# Patient Record
Sex: MALE | Race: BLACK OR AFRICAN AMERICAN | NOT HISPANIC OR LATINO | Employment: FULL TIME | URBAN - METROPOLITAN AREA
[De-identification: names, ages, dates, MRNs, and addresses within clinical notes are randomized per-mention and may not be internally consistent; named-entity substitution may affect disease eponyms.]

---

## 2023-04-25 LAB
ALANINE AMINOTRANSFERASE (SGPT) (U/L) IN SER/PLAS: 25 U/L (ref 10–52)
ALBUMIN (G/DL) IN SER/PLAS: 4.8 G/DL (ref 3.4–5)
ALKALINE PHOSPHATASE (U/L) IN SER/PLAS: 63 U/L (ref 33–136)
ANION GAP IN SER/PLAS: 13 MMOL/L (ref 10–20)
ASPARTATE AMINOTRANSFERASE (SGOT) (U/L) IN SER/PLAS: 20 U/L (ref 9–39)
BASOPHILS (10*3/UL) IN BLOOD BY AUTOMATED COUNT: 0.03 X10E9/L (ref 0–0.1)
BASOPHILS/100 LEUKOCYTES IN BLOOD BY AUTOMATED COUNT: 0.6 % (ref 0–2)
BILIRUBIN TOTAL (MG/DL) IN SER/PLAS: 0.6 MG/DL (ref 0–1.2)
CALCIUM (MG/DL) IN SER/PLAS: 9.7 MG/DL (ref 8.6–10.6)
CARBON DIOXIDE, TOTAL (MMOL/L) IN SER/PLAS: 31 MMOL/L (ref 21–32)
CD3+CD4+ ABSOLUTE: 0.75 X10E9/L (ref 0.35–2.74)
CD3+CD8+ ABSOLUTE: 0.49 X10E9/L (ref 0.08–1.49)
CD4/CD8 RATIO: 1.55 (ref 1–3.5)
CD45%: 100 %
CHLORIDE (MMOL/L) IN SER/PLAS: 105 MMOL/L (ref 98–107)
CP CD3+CD4+%: 48 % (ref 29–57)
CP CD3+CD8+%: 31 % (ref 7–31)
CREATININE (MG/DL) IN SER/PLAS: 0.89 MG/DL (ref 0.5–1.3)
EOSINOPHILS (10*3/UL) IN BLOOD BY AUTOMATED COUNT: 0.09 X10E9/L (ref 0–0.7)
EOSINOPHILS/100 LEUKOCYTES IN BLOOD BY AUTOMATED COUNT: 1.7 % (ref 0–6)
ERYTHROCYTE DISTRIBUTION WIDTH (RATIO) BY AUTOMATED COUNT: 12.1 % (ref 11.5–14.5)
ERYTHROCYTE MEAN CORPUSCULAR HEMOGLOBIN CONCENTRATION (G/DL) BY AUTOMATED: 32.4 G/DL (ref 32–36)
ERYTHROCYTE MEAN CORPUSCULAR VOLUME (FL) BY AUTOMATED COUNT: 99 FL (ref 80–100)
ERYTHROCYTES (10*6/UL) IN BLOOD BY AUTOMATED COUNT: 4.16 X10E12/L (ref 4.5–5.9)
FMETH: NORMAL
FSIT1: NORMAL
GFR MALE: >90 ML/MIN/1.73M2
GLUCOSE (MG/DL) IN SER/PLAS: 66 MG/DL (ref 74–99)
HEMATOCRIT (%) IN BLOOD BY AUTOMATED COUNT: 41.1 % (ref 41–52)
HEMOGLOBIN (G/DL) IN BLOOD: 13.3 G/DL (ref 13.5–17.5)
IMMATURE GRANULOCYTES/100 LEUKOCYTES IN BLOOD BY AUTOMATED COUNT: 0.2 % (ref 0–0.9)
LEUKOCYTES (10*3/UL) IN BLOOD BY AUTOMATED COUNT: 5.2 X10E9/L (ref 4.4–11.3)
LYMPHOCYTES (10*3/UL) IN BLOOD BY AUTOMATED COUNT: 1.57 X10E9/L (ref 1.2–4.8)
LYMPHOCYTES/100 LEUKOCYTES IN BLOOD BY AUTOMATED COUNT: 30.4 % (ref 13–44)
MONOCYTES (10*3/UL) IN BLOOD BY AUTOMATED COUNT: 0.44 X10E9/L (ref 0.1–1)
MONOCYTES/100 LEUKOCYTES IN BLOOD BY AUTOMATED COUNT: 8.5 % (ref 2–10)
NEUTROPHILS (10*3/UL) IN BLOOD BY AUTOMATED COUNT: 3.02 X10E9/L (ref 1.2–7.7)
NEUTROPHILS/100 LEUKOCYTES IN BLOOD BY AUTOMATED COUNT: 58.6 % (ref 40–80)
NRBC (PER 100 WBCS) BY AUTOMATED COUNT: 0 /100 WBC (ref 0–0)
PLATELETS (10*3/UL) IN BLOOD AUTOMATED COUNT: 212 X10E9/L (ref 150–450)
POTASSIUM (MMOL/L) IN SER/PLAS: 4.6 MMOL/L (ref 3.5–5.3)
PROTEIN TOTAL: 6.9 G/DL (ref 6.4–8.2)
SODIUM (MMOL/L) IN SER/PLAS: 144 MMOL/L (ref 136–145)
UREA NITROGEN (MG/DL) IN SER/PLAS: 14 MG/DL (ref 6–23)

## 2023-04-26 LAB
CHLAMYDIA TRACH., AMPLIFIED: NEGATIVE
HIV-1 RNA PCR VIRAL LOAD LOG: ABNORMAL LOG10 CPY/ML
HIV-1 RNA VIRAL LOAD: ABNORMAL COPIES/ML
N. GONORRHEA, AMPLIFIED: NEGATIVE

## 2023-04-27 LAB
RPR MONITORING: NONREACTIVE
VDRL: NORMAL

## 2023-09-02 PROBLEM — R53.83 FATIGUE: Status: ACTIVE | Noted: 2023-09-02

## 2023-09-02 PROBLEM — R09.81 NASAL CONGESTION: Status: ACTIVE | Noted: 2023-09-02

## 2023-09-02 PROBLEM — K21.9 GASTRIC REFLUX: Status: ACTIVE | Noted: 2023-09-02

## 2023-09-02 PROBLEM — L91.8 SKIN TAG: Status: ACTIVE | Noted: 2023-09-02

## 2023-09-02 PROBLEM — M72.2 PLANTAR FASCIITIS: Status: ACTIVE | Noted: 2023-09-02

## 2023-09-02 PROBLEM — A60.00 GENITAL HERPES SIMPLEX: Status: ACTIVE | Noted: 2023-09-02

## 2023-09-02 PROBLEM — M54.50 LOW BACK PAIN: Status: ACTIVE | Noted: 2023-09-02

## 2023-09-02 PROBLEM — K40.90 INGUINAL HERNIA, LEFT: Status: ACTIVE | Noted: 2023-09-02

## 2023-09-02 PROBLEM — N52.9 MALE ERECTILE DISORDER: Status: ACTIVE | Noted: 2023-09-02

## 2023-09-02 PROBLEM — G47.33 OSA (OBSTRUCTIVE SLEEP APNEA): Status: ACTIVE | Noted: 2023-09-02

## 2023-09-02 PROBLEM — G47.00 INSOMNIA: Status: ACTIVE | Noted: 2023-09-02

## 2023-09-02 PROBLEM — R10.32 ABDOMINAL PAIN, LLQ (LEFT LOWER QUADRANT): Status: ACTIVE | Noted: 2023-09-02

## 2023-09-02 PROBLEM — F41.9 ANXIETY: Status: ACTIVE | Noted: 2023-09-02

## 2023-09-02 PROBLEM — M25.512 LEFT SHOULDER PAIN: Status: ACTIVE | Noted: 2023-09-02

## 2023-09-02 PROBLEM — B20 HIV INFECTION, SYMPTOMATIC (MULTI): Status: ACTIVE | Noted: 2023-09-02

## 2023-09-02 PROBLEM — J34.2 NASAL SEPTAL DEVIATION: Status: ACTIVE | Noted: 2023-09-02

## 2023-09-02 PROBLEM — J34.3 HYPERTROPHY OF INFERIOR NASAL TURBINATE: Status: ACTIVE | Noted: 2023-09-02

## 2023-09-02 PROBLEM — M25.569 KNEE PAIN: Status: ACTIVE | Noted: 2023-09-02

## 2023-09-02 RX ORDER — CYANOCOBALAMIN (VITAMIN B-12) 500 MCG
1 TABLET ORAL DAILY
COMMUNITY
Start: 2014-03-02

## 2023-09-02 RX ORDER — MULTIVITAMIN WITH MINERALS
1 TABLET ORAL DAILY
COMMUNITY

## 2023-09-02 RX ORDER — FLUTICASONE PROPIONATE 50 MCG
2 SPRAY, SUSPENSION (ML) NASAL DAILY
COMMUNITY
Start: 2021-04-07

## 2023-09-02 RX ORDER — VARDENAFIL 11.85 MG/1
1 TABLET ORAL DAILY PRN
COMMUNITY
Start: 2014-06-25

## 2023-09-02 RX ORDER — FAMCICLOVIR 250 MG/1
250 TABLET ORAL 3 TIMES DAILY
COMMUNITY
End: 2023-12-13

## 2023-09-02 RX ORDER — NAPROXEN SODIUM 220 MG
TABLET ORAL DAILY PRN
COMMUNITY
End: 2023-10-05 | Stop reason: ALTCHOICE

## 2023-09-02 RX ORDER — EMTRICITABINE AND TENOFOVIR ALAFENAMIDE 200; 25 MG/1; MG/1
1 TABLET ORAL DAILY
COMMUNITY
End: 2023-11-08

## 2023-09-02 RX ORDER — BICTEGRAVIR SODIUM, EMTRICITABINE, AND TENOFOVIR ALAFENAMIDE FUMARATE 50; 200; 25 MG/1; MG/1; MG/1
1 TABLET ORAL DAILY
COMMUNITY
Start: 2022-08-16 | End: 2023-10-05 | Stop reason: SINTOL

## 2023-10-10 ENCOUNTER — OFFICE VISIT (OUTPATIENT)
Dept: IMMUNOLOGY | Facility: CLINIC | Age: 63
End: 2023-10-10
Payer: COMMERCIAL

## 2023-10-10 VITALS
WEIGHT: 182.8 LBS | OXYGEN SATURATION: 95 % | HEART RATE: 66 BPM | DIASTOLIC BLOOD PRESSURE: 75 MMHG | HEIGHT: 70 IN | TEMPERATURE: 98.1 F | BODY MASS INDEX: 26.17 KG/M2 | RESPIRATION RATE: 16 BRPM | SYSTOLIC BLOOD PRESSURE: 125 MMHG

## 2023-10-10 DIAGNOSIS — Z13.1 DIABETES MELLITUS SCREENING: ICD-10-CM

## 2023-10-10 DIAGNOSIS — B20 HIV INFECTION, SYMPTOMATIC (MULTI): Primary | ICD-10-CM

## 2023-10-10 DIAGNOSIS — Z12.5 PROSTATE CANCER SCREENING: ICD-10-CM

## 2023-10-10 DIAGNOSIS — Z13.220 SCREENING CHOLESTEROL LEVEL: ICD-10-CM

## 2023-10-10 PROBLEM — Z86.19 PERSONAL HISTORY OF OTHER INFECTIOUS AND PARASITIC DISEASES: Status: ACTIVE | Noted: 2023-10-10

## 2023-10-10 LAB
ALBUMIN SERPL BCP-MCNC: 4.7 G/DL (ref 3.4–5)
ALP SERPL-CCNC: 68 U/L (ref 33–136)
ALT SERPL W P-5'-P-CCNC: 29 U/L (ref 10–52)
ANION GAP SERPL CALC-SCNC: 14 MMOL/L (ref 10–20)
AST SERPL W P-5'-P-CCNC: 20 U/L (ref 9–39)
BASOPHILS # BLD AUTO: 0.02 X10*3/UL (ref 0–0.1)
BASOPHILS NFR BLD AUTO: 0.4 %
BILIRUB SERPL-MCNC: 0.6 MG/DL (ref 0–1.2)
BUN SERPL-MCNC: 14 MG/DL (ref 6–23)
CALCIUM SERPL-MCNC: 9.7 MG/DL (ref 8.6–10.6)
CHLORIDE SERPL-SCNC: 101 MMOL/L (ref 98–107)
CHOLEST SERPL-MCNC: 181 MG/DL (ref 0–199)
CHOLESTEROL/HDL RATIO: 3.7
CO2 SERPL-SCNC: 29 MMOL/L (ref 21–32)
CREAT SERPL-MCNC: 1.04 MG/DL (ref 0.5–1.3)
EOSINOPHIL # BLD AUTO: 0.08 X10*3/UL (ref 0–0.7)
EOSINOPHIL NFR BLD AUTO: 1.6 %
ERYTHROCYTE [DISTWIDTH] IN BLOOD BY AUTOMATED COUNT: 12.3 % (ref 11.5–14.5)
EST. AVERAGE GLUCOSE BLD GHB EST-MCNC: 65 MG/DL
GFR SERPL CREATININE-BSD FRML MDRD: 80 ML/MIN/1.73M*2
GLUCOSE SERPL-MCNC: 69 MG/DL (ref 74–99)
HBA1C MFR BLD: 3.9 %
HCT VFR BLD AUTO: 46.2 % (ref 41–52)
HDLC SERPL-MCNC: 49.4 MG/DL
HGB BLD-MCNC: 15 G/DL (ref 13.5–17.5)
IMM GRANULOCYTES # BLD AUTO: 0 X10*3/UL (ref 0–0.7)
IMM GRANULOCYTES NFR BLD AUTO: 0 % (ref 0–0.9)
LDLC SERPL CALC-MCNC: 115 MG/DL (ref 140–190)
LYMPHOCYTES # BLD AUTO: 1.67 X10*3/UL (ref 1.2–4.8)
LYMPHOCYTES NFR BLD AUTO: 33.5 %
MCH RBC QN AUTO: 31.5 PG (ref 26–34)
MCHC RBC AUTO-ENTMCNC: 32.5 G/DL (ref 32–36)
MCV RBC AUTO: 97 FL (ref 80–100)
MONOCYTES # BLD AUTO: 0.43 X10*3/UL (ref 0.1–1)
MONOCYTES NFR BLD AUTO: 8.6 %
NEUTROPHILS # BLD AUTO: 2.78 X10*3/UL (ref 1.2–7.7)
NEUTROPHILS NFR BLD AUTO: 55.9 %
NON HDL CHOLESTEROL: 132 MG/DL (ref 0–149)
NRBC BLD-RTO: 0 /100 WBCS (ref 0–0)
PLATELET # BLD AUTO: 216 X10*3/UL (ref 150–450)
PMV BLD AUTO: 11.4 FL (ref 7.5–11.5)
POTASSIUM SERPL-SCNC: 4.3 MMOL/L (ref 3.5–5.3)
PROT SERPL-MCNC: 7.2 G/DL (ref 6.4–8.2)
PSA SERPL-MCNC: 0.6 NG/ML
RBC # BLD AUTO: 4.76 X10*6/UL (ref 4.5–5.9)
SODIUM SERPL-SCNC: 140 MMOL/L (ref 136–145)
TRIGL SERPL-MCNC: 81 MG/DL (ref 0–149)
VLDL: 16 MG/DL (ref 0–40)
WBC # BLD AUTO: 5 X10*3/UL (ref 4.4–11.3)

## 2023-10-10 PROCEDURE — 85025 COMPLETE CBC W/AUTO DIFF WBC: CPT | Performed by: INTERNAL MEDICINE

## 2023-10-10 PROCEDURE — 87536 HIV-1 QUANT&REVRSE TRNSCRPJ: CPT | Performed by: INTERNAL MEDICINE

## 2023-10-10 PROCEDURE — 1036F TOBACCO NON-USER: CPT | Performed by: INTERNAL MEDICINE

## 2023-10-10 PROCEDURE — 36415 COLL VENOUS BLD VENIPUNCTURE: CPT | Performed by: INTERNAL MEDICINE

## 2023-10-10 PROCEDURE — 83036 HEMOGLOBIN GLYCOSYLATED A1C: CPT | Performed by: INTERNAL MEDICINE

## 2023-10-10 PROCEDURE — 80053 COMPREHEN METABOLIC PANEL: CPT | Performed by: INTERNAL MEDICINE

## 2023-10-10 PROCEDURE — 84153 ASSAY OF PSA TOTAL: CPT | Performed by: INTERNAL MEDICINE

## 2023-10-10 PROCEDURE — 88185 FLOWCYTOMETRY/TC ADD-ON: CPT | Mod: TC | Performed by: INTERNAL MEDICINE

## 2023-10-10 PROCEDURE — 80061 LIPID PANEL: CPT | Performed by: INTERNAL MEDICINE

## 2023-10-10 PROCEDURE — 99214 OFFICE O/P EST MOD 30 MIN: CPT | Performed by: INTERNAL MEDICINE

## 2023-10-10 PROCEDURE — 4274F FLU IMMUNO ADMIND RCVD: CPT | Performed by: INTERNAL MEDICINE

## 2023-10-10 RX ORDER — ATORVASTATIN CALCIUM 20 MG/1
20 TABLET, FILM COATED ORAL DAILY
Qty: 30 TABLET | Refills: 11 | Status: SHIPPED | OUTPATIENT
Start: 2023-10-10 | End: 2024-10-09

## 2023-10-10 ASSESSMENT — ENCOUNTER SYMPTOMS
ABDOMINAL PAIN: 0
RHINORRHEA: 0
COUGH: 0
CHILLS: 0
PALPITATIONS: 0
UNEXPECTED WEIGHT CHANGE: 0
SHORTNESS OF BREATH: 0
CONSTIPATION: 0
LIGHT-HEADEDNESS: 0
FEVER: 0
DIZZINESS: 0
FATIGUE: 0
TROUBLE SWALLOWING: 0
NAUSEA: 0
MYALGIAS: 0
SINUS PAIN: 0
HEMATURIA: 0
SINUS PRESSURE: 0
SORE THROAT: 0
DYSURIA: 0
VOMITING: 0
DIARRHEA: 0

## 2023-10-10 ASSESSMENT — PAIN SCALES - GENERAL: PAINLEVEL: 0-NO PAIN

## 2023-10-10 NOTE — PROGRESS NOTES
HIV Clinic Follow-up Visit:    Román Martinez was last seen in [facility], [place] on 4/2023    Missed antiretroviral doses in last 72 hours? No    Sexually active? yes, Partner/s aware of diagnosis? yes,     Condom use? Never, Partner on PrEP? Yes    Tobacco use: No     SUBJECTIVE: Mr. Martinez is doing well, no concerns or complaints. Has been wearing his CPAP every night and no concerns with it. Is sexually active with 1 partner. Takes Descovy/Tivicay every day, no missed doses in the past month, no problems obtaining meds from the pharmacy, no side effects.   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 Getting lots of accolades. Volunteering with an organization out of [place] so will be able to travel more.   Mood is good. Using techniques he learned during COVID, no longer in therapy.  Discussed REPIREVE trial, wiling to start a statin    Review of Systems  Review of Systems   Constitutional:  Negative for chills, fatigue, fever and unexpected weight change.   HENT:  Negative for congestion, postnasal drip, rhinorrhea, sinus pressure, sinus pain, sore throat and trouble swallowing.    Eyes:  Negative for visual disturbance.   Respiratory:  Negative for cough and shortness of breath.    Cardiovascular:  Negative for chest pain and palpitations.   Gastrointestinal:  Negative for abdominal pain, constipation, diarrhea, nausea and vomiting.   Genitourinary:  Negative for dysuria and hematuria.   Musculoskeletal:  Negative for gait problem and myalgias.   Skin:  Negative for rash.   Neurological:  Negative for dizziness, syncope and light-headedness.       CURRENT MEDICATIONS:    Current Outpatient Medications:     cholecalciferol (Vitamin D-3) 10 MCG (400 UNIT) tablet, Take 1 tablet (10 mcg) by mouth once daily. LIKE DIRECTED, Disp: , Rfl:     dolutegravir (Tivicay) 50 mg tablet, Take 1 tablet (50 mg) by mouth once daily., Disp: , Rfl:     emtricitabine-tenofovir alafen (Descovy) 200-25 mg tablet, Take 1 tablet by mouth  once daily., Disp: , Rfl:     famciclovir (Famvir) 250 mg tablet, Take 1 tablet (250 mg) by mouth 3 times a day., Disp: , Rfl:     fluticasone (Flonase) 50 mcg/actuation nasal spray, Administer 2 sprays into affected nostril(s) once daily., Disp: , Rfl:     multivitamin with minerals (Multiple Vitamin-Minerals) tablet, Take 1 tablet by mouth once daily., Disp: , Rfl:     vardenafiL 10 mg tablet,disintegrating, Take 1 tablet by mouth once daily as needed., Disp: , Rfl:     PHYSICAL EXAMINATION:  Visit Vitals  Smoking Status Never       Physical Exam   Physical Exam  Constitutional:       Appearance: Normal appearance.   HENT:      Head: Normocephalic and atraumatic.      Mouth/Throat:      Mouth: Mucous membranes are moist.   Eyes:      General: No scleral icterus.     Conjunctiva/sclera: Conjunctivae normal.      Pupils: Pupils are equal, round, and reactive to light.   Cardiovascular:      Rate and Rhythm: Normal rate and regular rhythm.      Heart sounds: No murmur heard.     No friction rub. No gallop.   Pulmonary:      Effort: Pulmonary effort is normal. No respiratory distress.      Breath sounds: Normal breath sounds. No wheezing or rhonchi.   Abdominal:      General: Bowel sounds are normal. There is no distension.      Palpations: Abdomen is soft.      Tenderness: There is no abdominal tenderness. There is no guarding or rebound.   Musculoskeletal:         General: No swelling, tenderness or signs of injury.      Right lower leg: No edema.      Left lower leg: No edema.   Skin:     Coloration: Skin is not jaundiced.      Findings: No bruising, erythema or rash.   Neurological:      General: No focal deficit present.      Mental Status: He is alert and oriented to person, place, and time. Mental status is at baseline.   Psychiatric:         Mood and Affect: Mood normal.         Behavior: Behavior normal.         Judgment: Judgment normal.         PERTINENT DATA:  Hemoglobin   Date Value Ref Range Status  "  04/25/2023 13.3 (L) 13.5 - 17.5 g/dL Final   08/16/2022 13.7 13.5 - 17.5 g/dL Final   08/11/2022 14.1 13.5 - 17.5 g/dL Final     Hematocrit   Date Value Ref Range Status   04/25/2023 41.1 41.0 - 52.0 % Final   08/16/2022 41.2 41.0 - 52.0 % Final   08/11/2022 42.3 41.0 - 52.0 % Final     Platelets   Date Value Ref Range Status   04/25/2023 212 150 - 450 x10E9/L Final   08/16/2022 177 150 - 450 x10E9/L Final   08/11/2022 203 150 - 450 x10E9/L Final      Latest Reference Range & Units 01/16/18 10:26 06/27/18 10:10 11/06/18 09:53 04/09/19 09:49 08/20/19 10:18 12/10/19 10:18 07/23/20 10:14 12/28/20 10:12 04/20/21 10:08 08/24/21 10:17 02/23/22 09:46 08/16/22 09:52 04/25/23 09:56   CD3+CD4+ Absolute 0.350 - 2.740 x10E9/L 0.603 0.691 0.802 0.734 0.559 0.832 0.710 0.730 0.960 0.792 0.706 0.630 0.754      Latest Reference Range & Units 07/23/20 10:14 08/24/21 10:17 08/16/22 09:52 04/25/23 09:56   RPR Monitoring   NONREACTIVE  SEE BELOW SEE BELOW SEE BELOW NONREACTIVE      Latest Reference Range & Units 01/16/18 10:26 06/27/18 10:10 11/06/18 09:53 04/09/19 09:49 08/20/19 10:18 12/10/19 10:18 07/23/20 10:14 12/28/20 10:12 04/20/21 10:08 08/24/21 10:17 02/23/22 09:46 08/16/22 09:52 04/25/23 09:56   HIV-1 RNA PCR Viral Load Log log10 cpy/mL NOT CALCULATED NOT CALCULATED NOT CALCULATED NOT CALCULATED NOT CALCULATED NOT CALCULATED 1.83 ! NOT CALCULATED NOT CALCULATED 1.89 ! NOT CALCULATED 1.56 ! NOT CALCULATED   !: Data is abnormal    No components found for: \"LIPID\", \"HBA1C\"  CrCl cannot be calculated (Patient's most recent lab result is older than the maximum 7 days allowed.).  The 10-year ASCVD risk score (Young HORTON, et al., 2019) is: 9.3%    Values used to calculate the score:      Age: 63 years      Sex: Male      Is Non- : Yes      Diabetic: No      Tobacco smoker: No      Systolic Blood Pressure: 128 mmHg      Is BP treated: No      HDL Cholesterol: 39.2 mg/dL      Total Cholesterol: 143 " mg/dL    ASSESSMENT / PLAN:  1. HIV:  - Switched to Biktarvy 2022 from Descovy/Tivicay out of concern that DTG may have made him more anxious. Developed diarrhea and changed back davon descovy/tivicay, which he is tolerating well. No missed doses.   - 2023: CD4 754, VL UD  - Repeat labs today     2. LISA:  - CPAP QHS  - F/w sleep medicine     3. Anxiety/depression:  - has seen 2 counselors in the past, done with therapy but using techniques he learned.     4. HCM:  -Vaccines: got flu vaccine 10/6/23  - Completed COVID-19 vaccinations  - Due for dental appointment; needs new provider, he will schedule  - Cancer screening: non-smoker, Colonoscopy in 2021 w/ tubular adenoma; due for repeat in 7 yrs as per GI (), PSA today after discussion with patient- has a strong family history- father, paternal grandfather and multiple paternal uncles have prostate cancer  -Non cancer screening: BP excellent today , A1C and lipid panel today.   -Plan to start atorvastatin 20mg daily based on data from Reprieve trial. Discussed with patient potential benefits- his father  of heart failure and he is interested in reducing his risk for cardiovascular disease       RTC in 4 months    Harriett Ness MD    Patient seen, examined and discussed. Agree with above.  Umm Foley MD

## 2023-10-11 LAB
CD3+CD4+ CELLS # BLD: 0.79 X10E9/L
CD3+CD4+ CELLS NFR BLD: 47 %
CD3+CD4+ CELLS/CD3+CD8+ CLL BLD: 1.47 %
CD3+CD8+ CELLS # BLD: 0.53 X10E9/L
CD3+CD8+ CELLS NFR BLD: 32 %
LYMPHOCYTES # SPEC AUTO: 1.67 X10*3/UL

## 2023-10-12 LAB
HIV1 RNA # PLAS NAA DL=20: ABNORMAL {COPIES}/ML
HIV1 RNA SPEC NAA+PROBE-LOG#: ABNORMAL {LOG_COPIES}/ML

## 2023-11-08 DIAGNOSIS — B20 HIV INFECTION, SYMPTOMATIC (MULTI): ICD-10-CM

## 2023-11-08 RX ORDER — DOLUTEGRAVIR SODIUM 50 MG/1
50 TABLET, FILM COATED ORAL DAILY
Qty: 30 TABLET | Refills: 5 | Status: SHIPPED | OUTPATIENT
Start: 2023-11-08 | End: 2024-05-13

## 2023-11-08 RX ORDER — EMTRICITABINE AND TENOFOVIR ALAFENAMIDE 200; 25 MG/1; MG/1
1 TABLET ORAL DAILY
Qty: 30 TABLET | Refills: 5 | Status: SHIPPED | OUTPATIENT
Start: 2023-11-08 | End: 2024-05-13

## 2023-12-13 DIAGNOSIS — A60.00 GENITAL HERPES SIMPLEX, UNSPECIFIED SITE: Primary | ICD-10-CM

## 2023-12-13 RX ORDER — FAMCICLOVIR 250 MG/1
250 TABLET ORAL 3 TIMES DAILY
Qty: 270 TABLET | Refills: 3 | Status: SHIPPED | OUTPATIENT
Start: 2023-12-13

## 2024-02-13 ENCOUNTER — TELEPHONE (OUTPATIENT)
Dept: IMMUNOLOGY | Facility: CLINIC | Age: 64
End: 2024-02-13
Payer: COMMERCIAL

## 2024-02-14 ENCOUNTER — OFFICE VISIT (OUTPATIENT)
Dept: IMMUNOLOGY | Facility: CLINIC | Age: 64
End: 2024-02-14
Payer: COMMERCIAL

## 2024-02-14 VITALS
HEART RATE: 62 BPM | BODY MASS INDEX: 26.34 KG/M2 | HEIGHT: 70 IN | SYSTOLIC BLOOD PRESSURE: 128 MMHG | TEMPERATURE: 97.3 F | OXYGEN SATURATION: 97 % | RESPIRATION RATE: 16 BRPM | DIASTOLIC BLOOD PRESSURE: 78 MMHG | WEIGHT: 184 LBS

## 2024-02-14 DIAGNOSIS — B20 HIV INFECTION, SYMPTOMATIC (MULTI): Primary | ICD-10-CM

## 2024-02-14 LAB
ALBUMIN SERPL BCP-MCNC: 4.6 G/DL (ref 3.4–5)
ALP SERPL-CCNC: 62 U/L (ref 33–136)
ALT SERPL W P-5'-P-CCNC: 31 U/L (ref 10–52)
ANION GAP SERPL CALC-SCNC: 12 MMOL/L (ref 10–20)
AST SERPL W P-5'-P-CCNC: 19 U/L (ref 9–39)
BASOPHILS # BLD AUTO: 0.02 X10*3/UL (ref 0–0.1)
BASOPHILS NFR BLD AUTO: 0.4 %
BILIRUB SERPL-MCNC: 1 MG/DL (ref 0–1.2)
BUN SERPL-MCNC: 14 MG/DL (ref 6–23)
CALCIUM SERPL-MCNC: 9.7 MG/DL (ref 8.6–10.6)
CD3+CD4+ CELLS # BLD: 0.77 X10E9/L
CD3+CD4+ CELLS # BLD: 773 /MM3
CD3+CD4+ CELLS NFR BLD: 48 %
CD3+CD4+ CELLS/CD3+CD8+ CLL BLD: 1.45 %
CD3+CD8+ CELLS # BLD: 0.53 X10E9/L
CD3+CD8+ CELLS NFR BLD: 33 %
CHLORIDE SERPL-SCNC: 103 MMOL/L (ref 98–107)
CHOLEST SERPL-MCNC: 108 MG/DL (ref 0–199)
CHOLESTEROL/HDL RATIO: 2.3
CO2 SERPL-SCNC: 30 MMOL/L (ref 21–32)
CREAT SERPL-MCNC: 0.93 MG/DL (ref 0.5–1.3)
EGFRCR SERPLBLD CKD-EPI 2021: >90 ML/MIN/1.73M*2
EOSINOPHIL # BLD AUTO: 0.07 X10*3/UL (ref 0–0.7)
EOSINOPHIL NFR BLD AUTO: 1.4 %
ERYTHROCYTE [DISTWIDTH] IN BLOOD BY AUTOMATED COUNT: 12.5 % (ref 11.5–14.5)
GLUCOSE SERPL-MCNC: 70 MG/DL (ref 74–99)
HCT VFR BLD AUTO: 41.4 % (ref 41–52)
HDLC SERPL-MCNC: 47.1 MG/DL
HGB BLD-MCNC: 13.9 G/DL (ref 13.5–17.5)
IMM GRANULOCYTES # BLD AUTO: 0.02 X10*3/UL (ref 0–0.7)
IMM GRANULOCYTES NFR BLD AUTO: 0.4 % (ref 0–0.9)
LDLC SERPL CALC-MCNC: 51 MG/DL
LYMPHOCYTES # BLD AUTO: 1.61 X10*3/UL (ref 1.2–4.8)
LYMPHOCYTES # SPEC AUTO: 1.61 X10*3/UL
LYMPHOCYTES NFR BLD AUTO: 32.3 %
MCH RBC QN AUTO: 31.8 PG (ref 26–34)
MCHC RBC AUTO-ENTMCNC: 33.6 G/DL (ref 32–36)
MCV RBC AUTO: 95 FL (ref 80–100)
MONOCYTES # BLD AUTO: 0.39 X10*3/UL (ref 0.1–1)
MONOCYTES NFR BLD AUTO: 7.8 %
NEUTROPHILS # BLD AUTO: 2.87 X10*3/UL (ref 1.2–7.7)
NEUTROPHILS NFR BLD AUTO: 57.7 %
NON HDL CHOLESTEROL: 61 MG/DL (ref 0–149)
NRBC BLD-RTO: 0 /100 WBCS (ref 0–0)
PLATELET # BLD AUTO: 205 X10*3/UL (ref 150–450)
POTASSIUM SERPL-SCNC: 4.2 MMOL/L (ref 3.5–5.3)
PROT SERPL-MCNC: 6.9 G/DL (ref 6.4–8.2)
RBC # BLD AUTO: 4.37 X10*6/UL (ref 4.5–5.9)
SODIUM SERPL-SCNC: 141 MMOL/L (ref 136–145)
TRIGL SERPL-MCNC: 49 MG/DL (ref 0–149)
VLDL: 10 MG/DL (ref 0–40)
WBC # BLD AUTO: 5 X10*3/UL (ref 4.4–11.3)

## 2024-02-14 PROCEDURE — 88185 FLOWCYTOMETRY/TC ADD-ON: CPT | Mod: TC | Performed by: INTERNAL MEDICINE

## 2024-02-14 PROCEDURE — 85025 COMPLETE CBC W/AUTO DIFF WBC: CPT | Performed by: INTERNAL MEDICINE

## 2024-02-14 PROCEDURE — 80053 COMPREHEN METABOLIC PANEL: CPT | Performed by: INTERNAL MEDICINE

## 2024-02-14 PROCEDURE — 1126F AMNT PAIN NOTED NONE PRSNT: CPT | Performed by: INTERNAL MEDICINE

## 2024-02-14 PROCEDURE — 1160F RVW MEDS BY RX/DR IN RCRD: CPT | Performed by: INTERNAL MEDICINE

## 2024-02-14 PROCEDURE — 99213 OFFICE O/P EST LOW 20 MIN: CPT | Performed by: INTERNAL MEDICINE

## 2024-02-14 PROCEDURE — 80061 LIPID PANEL: CPT | Performed by: INTERNAL MEDICINE

## 2024-02-14 PROCEDURE — 86318 IA INFECTIOUS AGENT ANTIBODY: CPT | Performed by: INTERNAL MEDICINE

## 2024-02-14 PROCEDURE — 1159F MED LIST DOCD IN RCRD: CPT | Performed by: INTERNAL MEDICINE

## 2024-02-14 PROCEDURE — 87536 HIV-1 QUANT&REVRSE TRNSCRPJ: CPT | Performed by: INTERNAL MEDICINE

## 2024-02-14 PROCEDURE — 36415 COLL VENOUS BLD VENIPUNCTURE: CPT | Performed by: INTERNAL MEDICINE

## 2024-02-14 PROCEDURE — 1036F TOBACCO NON-USER: CPT | Performed by: INTERNAL MEDICINE

## 2024-02-14 ASSESSMENT — PAIN SCALES - GENERAL: PAINLEVEL: 0-NO PAIN

## 2024-02-14 NOTE — PROGRESS NOTES
Subjective   Román Martinez is a 64 y.o. male who presents to the [facility] for follow-up of HIV infection.   Doing well. Will retire this spring, looking forward to it.  Work going well-feels less stressed as finishing up.  Taking his meds, no issues  We reviewed all his labs from last visit-all good  Tolerating  Objective   Vitals:    02/14/24 0906   BP: 128/78   Pulse: 62   Resp: 16   Temp: 36.3 °C (97.3 °F)   SpO2: 97%        Current Outpatient Medications:     atorvastatin (Lipitor) 20 mg tablet, Take 1 tablet (20 mg) by mouth once daily., Disp: 30 tablet, Rfl: 11    cholecalciferol (Vitamin D-3) 10 MCG (400 UNIT) tablet, Take 1 tablet (10 mcg) by mouth once daily. LIKE DIRECTED, Disp: , Rfl:     Descovy 200-25 mg tablet, TAKE 1 TABLET BY MOUTH EVERY DAY, Disp: 30 tablet, Rfl: 5    famciclovir (Famvir) 250 mg tablet, TAKE 1 TABLET THREE TIMES A DAY, Disp: 270 tablet, Rfl: 3    fluticasone (Flonase) 50 mcg/actuation nasal spray, Administer 2 sprays into affected nostril(s) once daily., Disp: , Rfl:     multivitamin with minerals (Multiple Vitamin-Minerals) tablet, Take 1 tablet by mouth once daily., Disp: , Rfl:     Tivicay 50 mg tablet, TAKE 1 TABLET BY MOUTH EVERY DAY, Disp: 30 tablet, Rfl: 5    vardenafiL 10 mg tablet,disintegrating, Take 1 tablet by mouth once daily as needed., Disp: , Rfl:    Physical Exam  Physical Exam  Constitutional:       General: not in acute distress.     Appearance: Normal appearance.   HENT:      Head: Normocephalic and atraumatic.      Pharynx: Oropharynx is clear. No oropharyngeal exudate.   Eyes:      General: No scleral icterus.  Cardiovascular:      Rate and Rhythm: Normal rate and regular rhythm.   Pulmonary:      Breath sounds: Normal breath sounds. No wheezing or rhonchi.   Abdominal:      Palpations: Abdomen is soft.      Tenderness: There is no abdominal tenderness.   Musculoskeletal:      Cervical back: Neck supple.      Right lower leg: No edema.      Left lower leg: No edema.  "  Skin:     Findings: No rash.   Neurological:      Mental Status: alert.   Psychiatric:         Mood and Affect: Mood normal.     Laboratory  Lab Results   Component Value Date    EBI5ACHDJ <Quantification (A) 04/25/2023    [id number] 0.785 10/10/2023      Lab Results   Component Value Date    WBC 5.0 10/10/2023    HGB 15.0 10/10/2023    HCT 46.2 10/10/2023    MCV 97 10/10/2023     10/10/2023      Lab Results   Component Value Date    GLUCOSE 69 (L) 10/10/2023    CALCIUM 9.7 10/10/2023     10/10/2023    K 4.3 10/10/2023    CO2 29 10/10/2023     10/10/2023    BUN 14 10/10/2023    CREATININE 1.04 10/10/2023      Lab Results   Component Value Date    CHOL 181 10/10/2023    CHOL 143 08/16/2022    CHOL 173 08/24/2021     Lab Results   Component Value Date    HDL 49.4 10/10/2023    HDL 39.2 (A) 08/16/2022    HDL 41.7 08/24/2021     Lab Results   Component Value Date    LDLCALC 115 (L) 10/10/2023     Lab Results   Component Value Date    TRIG 81 10/10/2023    TRIG 69 08/16/2022    TRIG 86 08/24/2021     No components found for: \"CHOLHDL\"      Assessment/Plan   Problem List Items Addressed This Visit       HIV infection, symptomatic (CMS/HCC) - Primary    Current Assessment & Plan     Doing well on TAF/FTC + DTG ( had diarrhea on Biktarvy). Check labs today.  Started statin last visit for CV prevention based on REPRIEVE, will check lipids again today now on a statin         Relevant Orders    CD4/8 Panel    CBC and Auto Differential    Comprehensive Metabolic Panel    HIV RNA, quantitative, PCR    Lipid Panel    RPR Monitoring      Health Maintenance  Got fall flu and COVID shots. Will get RSV  Reminded to make dental appt  Umm Foley MD   "

## 2024-02-14 NOTE — ASSESSMENT & PLAN NOTE
Doing well on TAF/FTC + DTG ( had diarrhea on Biktarvy). Check labs today.  Started statin last visit for CV prevention based on REPRIEVE, will check lipids again today now on a statin

## 2024-02-15 LAB
HIV1 RNA # PLAS NAA DL=20: NOT DETECTED {COPIES}/ML
HIV1 RNA SPEC NAA+PROBE-LOG#: NORMAL {LOG_COPIES}/ML
RPR SER QL: NONREACTIVE

## 2024-03-28 ENCOUNTER — DOCUMENTATION (OUTPATIENT)
Dept: IMMUNOLOGY | Facility: CLINIC | Age: 64
End: 2024-03-28
Payer: COMMERCIAL

## 2024-03-28 NOTE — PROGRESS NOTES
Pt called on 3/27/24 with c/o increased lethargy, am cough and the night before he had cramps in his chest when climbing the stairs.  He also has been having bad allergy symptoms and believes he had bronchitis a month ago.  Pt came into the office this morning for an assessment.    LZ=588/77 P= 70 and oxygen saturation 96%  Lungs were CTA-no wheezing noted.  Pt reports feeling better today.    Pt was instructed to continue with decongestant and increase his fluid take.    Pt will call back if any additional symptoms develop.

## 2024-05-13 DIAGNOSIS — B20 HIV INFECTION, SYMPTOMATIC (MULTI): ICD-10-CM

## 2024-05-13 RX ORDER — EMTRICITABINE AND TENOFOVIR ALAFENAMIDE 200; 25 MG/1; MG/1
1 TABLET ORAL DAILY
Qty: 30 TABLET | Refills: 5 | Status: SHIPPED | OUTPATIENT
Start: 2024-05-13

## 2024-05-13 RX ORDER — DOLUTEGRAVIR SODIUM 50 MG/1
50 TABLET, FILM COATED ORAL DAILY
Qty: 30 TABLET | Refills: 5 | Status: SHIPPED | OUTPATIENT
Start: 2024-05-13

## 2024-07-22 ENCOUNTER — TELEPHONE (OUTPATIENT)
Dept: INFECTIOUS DISEASES | Facility: HOSPITAL | Age: 64
End: 2024-07-22
Payer: COMMERCIAL

## 2024-07-22 NOTE — TELEPHONE ENCOUNTER
Margy contacted pt re: insurance concerns.  Pt now retired and on med d plan - cannot use copay card.  Is inquiring about getting back on ohdap.  Pt not taking SSA yet - is only drawing from his 401k.  Margy LM for Francisca re: documentation needed for proof of income.  Sw to follow up with pt.     UPDATE  Margy confirmed with [facility] that pt can submit tax transcript next year and a bank statement for right now.  Margy notified pt, who will submit information to margy.

## 2024-07-23 ENCOUNTER — APPOINTMENT (OUTPATIENT)
Dept: IMMUNOLOGY | Facility: CLINIC | Age: 64
End: 2024-07-23
Payer: COMMERCIAL

## 2024-07-30 ENCOUNTER — TELEPHONE (OUTPATIENT)
Dept: IMMUNOLOGY | Facility: CLINIC | Age: 64
End: 2024-07-30
Payer: COMMERCIAL

## 2024-07-31 ENCOUNTER — OFFICE VISIT (OUTPATIENT)
Dept: IMMUNOLOGY | Facility: CLINIC | Age: 64
End: 2024-07-31
Payer: COMMERCIAL

## 2024-07-31 VITALS
WEIGHT: 185 LBS | TEMPERATURE: 98 F | SYSTOLIC BLOOD PRESSURE: 124 MMHG | HEIGHT: 70 IN | DIASTOLIC BLOOD PRESSURE: 78 MMHG | RESPIRATION RATE: 16 BRPM | BODY MASS INDEX: 26.48 KG/M2 | HEART RATE: 62 BPM | OXYGEN SATURATION: 96 %

## 2024-07-31 DIAGNOSIS — B20 HIV INFECTION, SYMPTOMATIC (MULTI): Primary | ICD-10-CM

## 2024-07-31 DIAGNOSIS — Z11.3 ROUTINE SCREENING FOR STI (SEXUALLY TRANSMITTED INFECTION): ICD-10-CM

## 2024-07-31 DIAGNOSIS — N52.9 MALE ERECTILE DISORDER: ICD-10-CM

## 2024-07-31 LAB
ALBUMIN SERPL BCP-MCNC: 4.5 G/DL (ref 3.4–5)
ALP SERPL-CCNC: 72 U/L (ref 33–136)
ALT SERPL W P-5'-P-CCNC: 28 U/L (ref 10–52)
ANION GAP SERPL CALC-SCNC: 15 MMOL/L (ref 10–20)
AST SERPL W P-5'-P-CCNC: 17 U/L (ref 9–39)
BASOPHILS # BLD AUTO: 0.02 X10*3/UL (ref 0–0.1)
BASOPHILS NFR BLD AUTO: 0.4 %
BILIRUB SERPL-MCNC: 1.1 MG/DL (ref 0–1.2)
BUN SERPL-MCNC: 13 MG/DL (ref 6–23)
CALCIUM SERPL-MCNC: 9.6 MG/DL (ref 8.6–10.6)
CD3+CD4+ CELLS # BLD: 0.87 X10E9/L
CD3+CD4+ CELLS # BLD: 870 /MM3
CD3+CD4+ CELLS NFR BLD: 47 %
CD3+CD4+ CELLS/CD3+CD8+ CLL BLD: 1.62 %
CD3+CD8+ CELLS # BLD: 0.54 X10E9/L
CD3+CD8+ CELLS NFR BLD: 29 %
CHLORIDE SERPL-SCNC: 103 MMOL/L (ref 98–107)
CHOLEST SERPL-MCNC: 110 MG/DL (ref 0–199)
CHOLESTEROL/HDL RATIO: 2.4
CO2 SERPL-SCNC: 27 MMOL/L (ref 21–32)
CREAT SERPL-MCNC: 0.9 MG/DL (ref 0.5–1.3)
EGFRCR SERPLBLD CKD-EPI 2021: >90 ML/MIN/1.73M*2
EOSINOPHIL # BLD AUTO: 0.1 X10*3/UL (ref 0–0.7)
EOSINOPHIL NFR BLD AUTO: 1.8 %
ERYTHROCYTE [DISTWIDTH] IN BLOOD BY AUTOMATED COUNT: 12.6 % (ref 11.5–14.5)
FLOW CYTOMETRY SPECIALIST REVIEW: NORMAL
GLUCOSE SERPL-MCNC: 89 MG/DL (ref 74–99)
HCT VFR BLD AUTO: 42.8 % (ref 41–52)
HDLC SERPL-MCNC: 46.3 MG/DL
HGB BLD-MCNC: 14.3 G/DL (ref 13.5–17.5)
IMM GRANULOCYTES # BLD AUTO: 0.05 X10*3/UL (ref 0–0.7)
IMM GRANULOCYTES NFR BLD AUTO: 0.9 % (ref 0–0.9)
LDLC SERPL CALC-MCNC: 51 MG/DL
LYMPHOCYTES # BLD AUTO: 1.85 X10*3/UL (ref 1.2–4.8)
LYMPHOCYTES # SPEC AUTO: 1.85 X10*3/UL
LYMPHOCYTES NFR BLD AUTO: 33.3 %
MCH RBC QN AUTO: 31.1 PG (ref 26–34)
MCHC RBC AUTO-ENTMCNC: 33.4 G/DL (ref 32–36)
MCV RBC AUTO: 93 FL (ref 80–100)
MONOCYTES # BLD AUTO: 0.52 X10*3/UL (ref 0.1–1)
MONOCYTES NFR BLD AUTO: 9.4 %
NEUTROPHILS # BLD AUTO: 3.02 X10*3/UL (ref 1.2–7.7)
NEUTROPHILS NFR BLD AUTO: 54.2 %
NON HDL CHOLESTEROL: 64 MG/DL (ref 0–149)
NRBC BLD-RTO: 0 /100 WBCS (ref 0–0)
PLATELET # BLD AUTO: 206 X10*3/UL (ref 150–450)
POTASSIUM SERPL-SCNC: 4.2 MMOL/L (ref 3.5–5.3)
PROT SERPL-MCNC: 7.1 G/DL (ref 6.4–8.2)
RBC # BLD AUTO: 4.6 X10*6/UL (ref 4.5–5.9)
SODIUM SERPL-SCNC: 141 MMOL/L (ref 136–145)
TRIGL SERPL-MCNC: 66 MG/DL (ref 0–149)
VLDL: 13 MG/DL (ref 0–40)
WBC # BLD AUTO: 5.6 X10*3/UL (ref 4.4–11.3)

## 2024-07-31 PROCEDURE — 1159F MED LIST DOCD IN RCRD: CPT | Performed by: INTERNAL MEDICINE

## 2024-07-31 PROCEDURE — 3008F BODY MASS INDEX DOCD: CPT | Performed by: INTERNAL MEDICINE

## 2024-07-31 PROCEDURE — 80061 LIPID PANEL: CPT | Performed by: INTERNAL MEDICINE

## 2024-07-31 PROCEDURE — 86318 IA INFECTIOUS AGENT ANTIBODY: CPT | Performed by: INTERNAL MEDICINE

## 2024-07-31 PROCEDURE — 99213 OFFICE O/P EST LOW 20 MIN: CPT | Performed by: INTERNAL MEDICINE

## 2024-07-31 PROCEDURE — 36415 COLL VENOUS BLD VENIPUNCTURE: CPT | Performed by: INTERNAL MEDICINE

## 2024-07-31 PROCEDURE — 85025 COMPLETE CBC W/AUTO DIFF WBC: CPT | Performed by: INTERNAL MEDICINE

## 2024-07-31 PROCEDURE — 87536 HIV-1 QUANT&REVRSE TRNSCRPJ: CPT | Performed by: INTERNAL MEDICINE

## 2024-07-31 PROCEDURE — 1036F TOBACCO NON-USER: CPT | Performed by: INTERNAL MEDICINE

## 2024-07-31 PROCEDURE — 84075 ASSAY ALKALINE PHOSPHATASE: CPT | Performed by: INTERNAL MEDICINE

## 2024-07-31 PROCEDURE — 1160F RVW MEDS BY RX/DR IN RCRD: CPT | Performed by: INTERNAL MEDICINE

## 2024-07-31 PROCEDURE — 1126F AMNT PAIN NOTED NONE PRSNT: CPT | Performed by: INTERNAL MEDICINE

## 2024-07-31 PROCEDURE — 87491 CHLMYD TRACH DNA AMP PROBE: CPT | Performed by: INTERNAL MEDICINE

## 2024-07-31 PROCEDURE — 88185 FLOWCYTOMETRY/TC ADD-ON: CPT | Performed by: INTERNAL MEDICINE

## 2024-07-31 RX ORDER — VARDENAFIL 11.85 MG/1
1 TABLET ORAL DAILY PRN
Qty: 10 TABLET | Refills: 5 | Status: SHIPPED | OUTPATIENT
Start: 2024-07-31

## 2024-07-31 ASSESSMENT — PAIN SCALES - GENERAL: PAINLEVEL: 0-NO PAIN

## 2024-07-31 NOTE — PROGRESS NOTES
Subjective   Román Martinez is a 64 y.o. male who presents to the [facility] for follow-up of HIV infection.     Doing great, enjoying detention. Just back from a trip. Friends in [place] threw him a detention party, then went to beach, then some museums. Had a great time.  Has never felt better.  No problems with meds. Did have concerns when switching to Medicare but Kamini got him on OhDAP  Doing some writing, staying busy    Objective   Vitals:    07/31/24 0928   BP: 124/78   Pulse: 62   Resp: 16   Temp: 36.7 °C (98 °F)   SpO2: 96%        Current Outpatient Medications:     atorvastatin (Lipitor) 20 mg tablet, Take 1 tablet (20 mg) by mouth once daily., Disp: 30 tablet, Rfl: 11    cholecalciferol (Vitamin D-3) 10 MCG (400 UNIT) tablet, Take 1 tablet (10 mcg) by mouth once daily. LIKE DIRECTED, Disp: , Rfl:     Descovy 200-25 mg tablet, TAKE 1 TABLET BY MOUTH EVERY DAY, Disp: 30 tablet, Rfl: 5    famciclovir (Famvir) 250 mg tablet, TAKE 1 TABLET THREE TIMES A DAY, Disp: 270 tablet, Rfl: 3    fluticasone (Flonase) 50 mcg/actuation nasal spray, Administer 2 sprays into affected nostril(s) once daily., Disp: , Rfl:     multivitamin with minerals (Multiple Vitamin-Minerals) tablet, Take 1 tablet by mouth once daily., Disp: , Rfl:     Tivicay 50 mg tablet, TAKE 1 TABLET BY MOUTH EVERY DAY, Disp: 30 tablet, Rfl: 5    vardenafiL 10 mg tablet,disintegrating, Take 1 tablet by mouth once daily as needed (sex)., Disp: 10 tablet, Rfl: 5   Physical Exam  Physical Exam  Constitutional:       General: not in acute distress.     Appearance: Normal appearance.   HENT:      Head: Normocephalic and atraumatic.      Pharynx: Oropharynx is clear. No oropharyngeal exudate.   Eyes:      General: No scleral icterus.  Cardiovascular:      Rate and Rhythm: Normal rate and regular rhythm.   Pulmonary:      Breath sounds: Normal breath sounds. No wheezing or rhonchi.   Abdominal:      Palpations: Abdomen is soft.      Tenderness: There is no abdominal  "tenderness.   Musculoskeletal:      Cervical back: Neck supple.      Right lower leg: No edema.      Left lower leg: No edema.   Skin:     Findings: No rash.   Neurological:      Mental Status: alert.   Psychiatric:         Mood and Affect: Mood normal.     Laboratory  Lab Results   Component Value Date    KES4DTNPW <Quantification (A) 04/25/2023    [id number] 0.773 02/14/2024      Lab Results   Component Value Date    WBC 5.6 07/31/2024    HGB 14.3 07/31/2024    HCT 42.8 07/31/2024    MCV 93 07/31/2024     07/31/2024      Lab Results   Component Value Date    GLUCOSE 89 07/31/2024    CALCIUM 9.6 07/31/2024     07/31/2024    K 4.2 07/31/2024    CO2 27 07/31/2024     07/31/2024    BUN 13 07/31/2024    CREATININE 0.90 07/31/2024      Lab Results   Component Value Date    CHOL 110 07/31/2024    CHOL 108 02/14/2024    CHOL 181 10/10/2023     Lab Results   Component Value Date    HDL 46.3 07/31/2024    HDL 47.1 02/14/2024    HDL 49.4 10/10/2023     Lab Results   Component Value Date    LDLCALC 51 07/31/2024    LDLCALC 51 02/14/2024    LDLCALC 115 (L) 10/10/2023     Lab Results   Component Value Date    TRIG 66 07/31/2024    TRIG 49 02/14/2024    TRIG 81 10/10/2023     No components found for: \"CHOLHDL\"      Assessment/Plan   Problem List Items Addressed This Visit       Male erectile disorder    Relevant Medications    vardenafiL 10 mg tablet,disintegrating    HIV infection, symptomatic (Multi) - Primary    Overview     Biktarvy gave him diarrhea         Current Assessment & Plan     Doing great on Taf/FTC + DTG, on statin for CVD prevention as well. Check labs today         Relevant Orders    CD4/8 Panel    CBC and Auto Differential (Completed)    Comprehensive Metabolic Panel (Completed)    HIV RNA, quantitative, PCR    Lipid Panel (Completed)     Other Visit Diagnoses       Routine screening for STI (sexually transmitted infection)        Relevant Orders    C. Trachomatis / N. Gonorrhoeae, " Amplified Detection    RPR Monitoring    C. Trachomatis / N. Gonorrhoeae, Amplified Detection    C. Trachomatis / N. Gonorrhoeae, Amplified Detection           Health Maintenance  Up to date with dental  STI testing today  Umm Foley MD

## 2024-08-01 LAB
C TRACH RRNA SPEC QL NAA+PROBE: NEGATIVE
HIV1 RNA # PLAS NAA DL=20: ABNORMAL {COPIES}/ML
HIV1 RNA SPEC NAA+PROBE-LOG#: ABNORMAL {LOG_COPIES}/ML
N GONORRHOEA DNA SPEC QL PROBE+SIG AMP: NEGATIVE
RPR SER QL: NONREACTIVE

## 2024-09-24 DIAGNOSIS — B20 HIV INFECTION, SYMPTOMATIC (MULTI): ICD-10-CM

## 2024-09-24 RX ORDER — ATORVASTATIN CALCIUM 20 MG/1
20 TABLET, FILM COATED ORAL DAILY
Qty: 30 TABLET | Refills: 5 | Status: SHIPPED | OUTPATIENT
Start: 2024-09-24 | End: 2025-09-24

## 2024-11-18 ENCOUNTER — TELEPHONE (OUTPATIENT)
Dept: INFECTIOUS DISEASES | Facility: HOSPITAL | Age: 64
End: 2024-11-18
Payer: MEDICARE

## 2024-11-18 DIAGNOSIS — B20 HIV INFECTION, SYMPTOMATIC (MULTI): ICD-10-CM

## 2024-11-18 RX ORDER — EMTRICITABINE AND TENOFOVIR ALAFENAMIDE 200; 25 MG/1; MG/1
1 TABLET ORAL DAILY
Qty: 30 TABLET | Refills: 5 | Status: SHIPPED | OUTPATIENT
Start: 2024-11-18

## 2024-11-18 RX ORDER — DOLUTEGRAVIR SODIUM 50 MG/1
50 TABLET, FILM COATED ORAL DAILY
Qty: 30 TABLET | Refills: 5 | Status: SHIPPED | OUTPATIENT
Start: 2024-11-18

## 2024-11-18 NOTE — TELEPHONE ENCOUNTER
Sw spoke with pt re: billing concerns.  Sw to review RW coverage with .  Pt will reach out with any additional concerns.

## 2024-12-31 DIAGNOSIS — A60.00 GENITAL HERPES SIMPLEX, UNSPECIFIED SITE: ICD-10-CM

## 2024-12-31 RX ORDER — FAMCICLOVIR 250 MG/1
250 TABLET ORAL 3 TIMES DAILY
Qty: 180 TABLET | Refills: 5 | Status: SHIPPED | OUTPATIENT
Start: 2024-12-31

## 2025-01-08 ENCOUNTER — APPOINTMENT (OUTPATIENT)
Dept: IMMUNOLOGY | Facility: CLINIC | Age: 65
End: 2025-01-08
Payer: MEDICARE

## 2025-01-21 ENCOUNTER — TELEPHONE (OUTPATIENT)
Dept: INFECTIOUS DISEASES | Facility: HOSPITAL | Age: 65
End: 2025-01-21
Payer: MEDICARE

## 2025-01-21 NOTE — TELEPHONE ENCOUNTER
1/8 Sw attempted to reach pt re: OHDAP renewal.  Sw LM for pt.       1/21 - Sw attempted to reach pt re: OHDAP renewal.  Sw LM for pt.

## 2025-01-29 ENCOUNTER — APPOINTMENT (OUTPATIENT)
Dept: IMMUNOLOGY | Facility: CLINIC | Age: 65
End: 2025-01-29
Payer: MEDICARE

## 2025-02-07 NOTE — PROGRESS NOTES
[facility] Sleep Medicine  [facility]  [address]  [facility] [address]  [phone]     [facility] Sleep Medicine [facility]  New Visit Note    Virtual or Telephone Consent    An interactive audio and video telecommunication system which permits real time communications between the patient (at the originating site) and provider (at the distant site) was utilized to provide this telehealth service.   Verbal consent was requested and obtained from Román Martinez on this date, 02/10/25 for a telehealth visit.     Subjective   Patient ID: Román Martinez is a 65 y.o. male with past medical history significant for Nasal Congestion, Anxiety, OBSTRUCTIVE SLEEP APNEA, and Insomnia.     2/10/2025: The patient had a virtual visit with me for a comprehensive sleep medicine evaluation due to needing to renew PAP supplies. His over 4 hours pap compliance rate was 100 %, and his ESS: 1 and IAN: 8  today. His pap is due, and he can get a new machine. He will call me to have another Home Sleep Study to confirm his diagnosis and get a new pap when he is ready. He has no issue with his pap and communicates with [facility].    HPI  Patient had been having these symptoms for the past  48 years. Patient had sleep study done in the past but no available records.       SLEEP STUDY HISTORY: (personally reviewed raw data such as interpretation report, data sheet, hypnogram, and titration table if available and applicable)  N/A- not on file    SLEEP-WAKE SCHEDULE: ( after pap)  Bedtime: 10-11 PM on weekdays, same on weekends  Subjective sleep latency: 15 minutes  Problems falling asleep: No  Number of awakenings: 2-4  times per night spontaneously for BR  Falls back asleep in 2 minutes  Problems staying asleep: sometimes  Final wake time: 6:30-7:30 Am on weekdays, same on weekends  Shift work: No  Naps: No  Average sleep duration (excluding naps): 7 hours    SLEEP  ENVIRONMENT  Sleep location: bed  Sleep status: sleeps alone  Room is dark:  Yes  Room is quiet: Yes  Room is cool: Yes  Bed comfort: good    SLEEP HABITS:   Activities before bedtime: no  Activities in bed: no  Preferred sleep position: back    SLEEP ROS: (after cpap)  Night symptoms: POSITIVE for snoring before PAP but decreased now, witnessed apnea before PAP but now , and wake up gasping and/or choking for air before PAP but not now  Morning symptoms: POSITIVE for unrefreshing sleep before PAP but not now  Daytime symptoms POSITIVE for excessive daytime sleepiness before PAP but not now  Hypersomnia / narcolepsy symptoms: Patient denies symptoms of a hypersomnolence disorder such as sleep paralysis, sleep-related hallucinations, and cataplexy.   RLS symptoms: Patient denies RLS symptoms.  Movements in sleep: Patient denies problematic movements in sleep such as seizures during sleep, frequent leg kicks / jerks while asleep, sleep-related bruxism, and waking up with bedsheets in disarray.  Parasomnia symptoms: Patient denies symptoms of parasomnia such as sleepwalking, sleeptalking, sleep-eating, acting out dreams, and nightmares.     WEIGHT: stable    REVIEW OF SYSTEMS: All other systems have been reviewed and are negative.    PERTINENT SOCIAL HISTORY:  Occupation: retired  Smoking: No   ETOH: Yes,socially  Marijuana: Yes   Caffeine: Yes, 2 cups of coffee in the morning  Sleep aids: No   Claustrophobia: No     PERTINENT PAST SURGICAL HISTORY:  non-contributory    PERTINENT FAMILY HISTORY:  OBSTRUCTIVE SLEEP APNEA with pap -father    Active Problems, Allergy List, Medication List, and PMH/PSH/FH/Social Hx have been reviewed and reconciled in chart. No significant changes unless documented in the pertinent chart section. Updates made when necessary.       Objective   There were no vitals filed for this visit.     REVIEW OF SYSTEMS  All other systems have been reviewed and are negative.    ALLERGIES  Allergies    Allergen Reactions    Acyclovir Hives       MEDICATIONS  Current Outpatient Medications   Medication Sig Dispense Refill    atorvastatin (Lipitor) 20 mg tablet Take 1 tablet (20 mg) by mouth once daily. 30 tablet 5    cholecalciferol (Vitamin D-3) 10 MCG (400 UNIT) tablet Take 1 tablet (10 mcg) by mouth once daily. LIKE DIRECTED      Descovy 200-25 mg tablet TAKE 1 TABLET BY MOUTH EVERY DAY 30 tablet 5    famciclovir (Famvir) 250 mg tablet TAKE 1 TABLET THREE TIMES A  tablet 5    fluticasone (Flonase) 50 mcg/actuation nasal spray Administer 2 sprays into affected nostril(s) once daily.      multivitamin with minerals (Multiple Vitamin-Minerals) tablet Take 1 tablet by mouth once daily.      Tivicay 50 mg tablet TAKE 1 TABLET BY MOUTH EVERY DAY 30 tablet 5    vardenafiL 10 mg tablet,disintegrating Take 1 tablet by mouth once daily as needed (sex). 10 tablet 5     No current facility-administered medications for this visit.       Physical Exam  Constitutional:alert and oriented to time, place, and person    PAP Adherence:  DURABLE MEDICAL EQUIPMENT COMPANY: [employer]  Machine: THERAPY: [employer] AIRSENSE 10 PRESSURE SETTINGS: 5 - 15 cm H2O  Mask:  F20  Issues with therapy: ISSUES WITH THERAPY: denies  Benefits with PAP: PERCEIVED BENEFITS OF PAP: refreshing sleep decreased or no snoring decreased nocturnal awakenings decreased episodes of nocturia sleeping for a longer duration of time better sleep quality decreased frequency of dry mouth    A PAP adherence download was obtained and data was reviewed personally today in clinic. (see scanned document in EPIC)        Assessment/Plan   Román Martinez is a 65 y.o. male who is seen to evaluate for obstructive sleep apnea. The pathophysiology of sleep apnea, diagnostic testing (HST vs PSG), treatment options (PAP, oral appliance, surgery, hypoglossal nerve stimulator called Inspire), and supportive management (weight loss, positional therapy, smoking  cessation, avoidance of alcohol and sedatives) were discussed with the patient in detail. Risk factors of sleep apnea as well as cardiometabolic and neurocognitive sequelae associated with untreated sleep apnea were also discussed. Lastly, patient was advised to avoid driving vehicle or operating heavy machinery when sleepy.     Román Martinez with the following problems:     # OBSTRUCTIVE SLEEP APNEA :  -Continue 5-16 cmH20 ACPAP and renew annual supplies through [employer].  -Sleep apnea and PAP therapy education were provided at length in the clinic today. Román Martinez verbalized understanding.  -Emphasized diet, exercise, and weight loss in the clinic, as were non-supine sleep, avoiding alcohol in the late evening, and driving or operating heavy machinery when sleepy.  -Román Martinezverbalizes understanding of the above instructions and risks.    #ANXIETY:   -Román Martinez report much better after retiring.  -Denies HI/SI       RTC 1 year      All of patient's questions were answered. He verbalizes understanding and agreement with my assessment and plan.    Please excuse any errors in grammar or translation related to dictation. Thanks.

## 2025-02-09 ASSESSMENT — SLEEP AND FATIGUE QUESTIONNAIRES
HOW LIKELY ARE YOU TO NOD OFF OR FALL ASLEEP WHILE SITTING QUIETLY AFTER LUNCH WITHOUT ALCOHOL: WOULD NEVER DOZE
SATISFACTION_WITH_CURRENT_SLEEP_PATTERN: DISSATISFIED
SLEEP_PROBLEM_NOTICEABLE_TO_OTHERS: SOMEWHAT
HOW LIKELY ARE YOU TO NOD OFF OR FALL ASLEEP WHEN YOU ARE A PASSENGER IN A CAR FOR AN HOUR WITHOUT A BREAK: WOULD NEVER DOZE
HOW LIKELY ARE YOU TO NOD OFF OR FALL ASLEEP WHILE SITTING AND READING: WOULD NEVER DOZE
HOW LIKELY ARE YOU TO NOD OFF OR FALL ASLEEP WHILE LYING DOWN TO REST IN THE AFTERNOON WHEN CIRCUMSTANCES PERMIT: WOULD NEVER DOZE
SLEEP_PROBLEM_INTERFERES_DAILY_ACTIVITIES: SOMEWHAT
SITING INACTIVE IN A PUBLIC PLACE LIKE A CLASS ROOM OR A MOVIE THEATER: WOULD NEVER DOZE
HOW LIKELY ARE YOU TO NOD OFF OR FALL ASLEEP IN A CAR, WHILE STOPPED FOR A FEW MINUTES IN TRAFFIC: WOULD NEVER DOZE
HOW LIKELY ARE YOU TO NOD OFF OR FALL ASLEEP WHILE SITTING AND TALKING TO SOMEONE: WOULD NEVER DOZE
ESS-CHAD TOTAL SCORE: 1
WORRIED_DISTRESSED_DUE_TO_SLEEP: A LITTLE
HOW LIKELY ARE YOU TO NOD OFF OR FALL ASLEEP WHILE WATCHING TV: SLIGHT CHANCE OF DOZING

## 2025-02-10 ENCOUNTER — OFFICE VISIT (OUTPATIENT)
Dept: SLEEP MEDICINE | Facility: HOSPITAL | Age: 65
End: 2025-02-10
Payer: MEDICARE

## 2025-02-10 VITALS — BODY MASS INDEX: 25.77 KG/M2 | HEIGHT: 70 IN | WEIGHT: 180 LBS

## 2025-02-10 DIAGNOSIS — G47.00 INSOMNIA, UNSPECIFIED TYPE: ICD-10-CM

## 2025-02-10 DIAGNOSIS — F41.9 ANXIETY: ICD-10-CM

## 2025-02-10 DIAGNOSIS — G47.33 OBSTRUCTIVE SLEEP APNEA (ADULT) (PEDIATRIC): ICD-10-CM

## 2025-02-10 DIAGNOSIS — G47.33 OSA (OBSTRUCTIVE SLEEP APNEA): Primary | ICD-10-CM

## 2025-02-10 DIAGNOSIS — R09.81 NASAL CONGESTION: ICD-10-CM

## 2025-02-10 PROCEDURE — G2211 COMPLEX E/M VISIT ADD ON: HCPCS

## 2025-02-10 PROCEDURE — 3008F BODY MASS INDEX DOCD: CPT

## 2025-02-10 PROCEDURE — 1036F TOBACCO NON-USER: CPT

## 2025-02-10 PROCEDURE — 1159F MED LIST DOCD IN RCRD: CPT

## 2025-02-10 PROCEDURE — 1160F RVW MEDS BY RX/DR IN RCRD: CPT

## 2025-02-10 PROCEDURE — 99213 OFFICE O/P EST LOW 20 MIN: CPT | Mod: GC,95

## 2025-02-10 PROCEDURE — 99203 OFFICE O/P NEW LOW 30 MIN: CPT

## 2025-02-10 NOTE — PATIENT INSTRUCTIONS
[facility]  [facility]  [address]  [facility] [id number] [phone]       Thank you for coming to the Sleep Medicine Clinic today! Your sleep medicine provider today was: ATTILA Mckeon Below is a summary of your treatment plan, patient education, other important information, and our contact numbers.    Dear Mr. Román Martinez       Your Sleep Provider Today: ATTILA Mckeon  Your Primary Care Physician: Umm Foley MD   Diagnosis:      Thank you for visiting  Sleep Medicine Clinic !     1.You are doing great, we ordered the annual supplies for you. Feel free to contact your DME company to get new supplies.    2. Please do not drive when you are sleepy and continue practicing the sleep hygiene as discussed in clinic.    3. FOR QUESTIONS AND CONCERNS:   a) : In case of problems with machine or mask interface, please contact your DME company first. DME is the company who provides you the machine and/or PAP supplies / accessories. If [employer] is your DME, you can reach them at [phone].   b): Please call my office with issues or questions: [phone] ([place]); [phone] ([place]); [phone] ([employer])    If you have a CPAP or BiPAP machine at home, please bring the unit and all accessories including the power cord to your appointments unless I tell you otherwise. Please have knowledge of the DME company you worked with to receive your PAP device. If you have copies of any previous sleep testing completed outside of , please bring with you to clinic as well. This information will make our visits more productive.     If you are new to CPAP or BiPAP, please note the minimum usage insurance requires to continuing coverage for the equipment as noted by your DME company. Please discuss equipment issues (PAP unit, mask fit, humidification, etc.) with your DME company first.       In  "the event that you are running more than 10 minutes late to your appointment, I will kindly ask you to reschedule. Thanks.      TREATMENT PLAN     - Continue current machine settings. Continue using machine every night all night.   - Renew PAP supplies. Order placed and sent to [employer].  - Please read the \"Patient Education\" section below for more detailed information. Try implementing tips, reminders, strategies, and supportive management.   - If not yet done, please sign up for [employer] to make a future schedule, send prescription requests, or send messages.    Follow-up Appointment:   Follow-up in 1 year.    PATIENT EDUCATION     OBSTRUCTIVE SLEEP APNEA (LISA) is a sleep disorder where your upper airway muscles relax during sleep and the airway intermittently and repetitively narrows and collapses leading to partially blocked airway (hypopnea) or completely blocked airway (apnea) which, in turn, can disrupt breathing in sleep, lower oxygen levels while you sleep and cause night time wakings. Because both apnea and hypopnea may cause higher carbon dioxide or low oxygen levels, untreated LISA can lead to heart arrhythmia, elevation of blood pressure, and make it harder for the body to consolidate memory and facilitate metabolism (leading to higher blood sugars at night). Frequent partial arousals occur during sleep resulting in sleep deprivation and daytime sleepiness. LISA is associated with an increased risk of cardiovascular disease, stroke, hypertension, and insulin resistance. Moreover, untreated LISA with excessive daytime sleepiness can increase the risk of motor vehicular accidents.    Below are conservative strategies for LISA regardless of LISA severity are:   Positional therapy - Avoid sleeping on your back.   Healthy diet and regular exercise to optimize weight is highly encouraged.   Avoid alcohol late in the evening and sedative-hypnotics as these substances can make sleep apnea worse. "   Improve breathing through the nose with intranasal steroid spray, saline rinse, or antihistamines    Safety: Avoid driving vehicle and operating heavy equipment while sleepy. Drowsy driving may lead to life-threatening motor vehicle accidents. A person driving while sleepy is 5 times more likely to have an accident. If you feel sleepy, pull over and take a short power nap (sleep for less than 30 minutes). Otherwise, ask somebody to drive you.    Treatment options for sleep apnea include weight management, positional therapy, Positive Airway Therapy (PAP) therapy, oral appliance therapy, hypoglossal nerve stimulator (Inspire) and select airway surgeries.    Starting Positive Airway Pressure (PAP): You were ordered a device to wear when you sleep called PAP (Positive Airway Pressure) to treat your sleep apnea. The order will be submitted to a durable medical equipment (DME) company who will arrange setting you up with the device. They will provide all the necessary equipment and discuss use and maintenance of the device with you as well as mask fitting and process of replacing / renewing PAP supplies or accessories. Once you get the machine, please start using it immediately. You may not be successful right away and that is okay. [place] be certain that you keep trying nightly and reach out to DME if you are struggling or having issues with machine usage.     *Please follow-up with me in 1-2 months of starting CPAP to see how well it is working for you and to do some troubleshooting if needed. Also, please bring all PAP equipment with you to follow up appointments unless told otherwise.     Important things to keep in mind as you start PAP:  Insurance will monitor your usage during the first 90 days. You should use your PAP - all night, every night, and including all naps (especially if naps are more than 30 minutes) for your health. The bare minimum is to use your PAP device while sleeping for at least 4 hours per  day at least 5-6 days per week.. Otherwise, your PAP device will be reclaimed by your DME company at 90 days.  There are many masks to choose from to wear with your PAP machine. If you are not comfortable with the first mask issued to you, call your DME company and ask for another option to try. You typically have a 30-day mask guarantee from the day you received your machine.   Discuss with your provider if you are having issues breathing with the machine or if the temperature or humidity feel uncomfortable.  Expect to have an adjustment period when you start your device. It helps to continuing wearing the machine every day for a period of time until you get more used to it. You can practice with wearing the mask alone if you need, then add in the PAP air pressure a few days later.   Reach out for help if you are struggling! The sleep medicine department can be reached at [phone]  We encourage you to download data monitoring apps to your phone. For OfficeDrop 10/11 - MyAir mackenzie. For Alaris - DreamMapper. Both apps are available in the [employer] store for free and are a great tool to monitor your progress with your PAP device night to night.    Tips for success with PAP machine usage:  Comfortable and well-fitting mask  Appropriate pressure on the machine  Using humidification  Support from bed partner and clinical team      Maintaining your CPAP/BPAP device:    The humidification chamber (aka water tank or water chamber) needs to be filled with distilled water to prevent buildup of white deposits in the future. If you cannot find distilled water, you can use tap water but expect to have white deposits buildup seen after prolonged use with tap water. If you start seeing white deposits on the water chamber, you can clean it by filling it with equal parts of distilled white vinegar and water. Let the vinegar-water mixture sit for 2 hours, and then rinse it with running tap water. Clean with  soap and water then let it dry.     You should try to keep your machine clean in order to work well. Here are some tips to clean PAP supplies / accessories:    Clean the humidification chamber (aka water tank) as well as your mask and tubing at least once a week with soap and water.   Alternatively, you can fill a sink or basin with warm water and add a little mild detergent, like Ivory dish soap. Gently wipe your supplies with the soapy water to free all the oils and dirt that may have collected. Once that's done, rinse these items with clean water until the soap is gone and let them air dry. You can hang your tubing over the curtain thalia in your bathroom so that it dries.  The mask insert (part of the mask that has contact with your skin) needs to be cleaned with soap and water daily. Another option is to wipe them down with CPAP wipes or baby wipes.    You should replace your mask and tubing frequently in order to prevent bacteria buildup, machine damage, and mask seal issues. The older the mask and hose, the high likelihood that there is bacteria buildup in it especially if they are not cleaned regularly. Dirty filters damage machines because build-up of dust and contaminants can cause machine to over-heat, and in time, damage the motor of machine. Cushions lose their seal over time as most masks are made of plastic and silicone while headgear is made of neoprene. These materials will break down with age and frequent use. Here is the recommended replacement schedule for PAP supplies / accessories:    Twice a month- disposable filters and cushions for nasal mask or nasal pillows.  Once a month- cushion for full face mask  Every 3 months- mask with headgear and PAP tubing (standard or heated hose)  Every 6 months- reusable filter, water chamber, and chin strap     Other useful information:    Some people do not put water in the tank while other people prefers to put water in the tank to prevent mouth dryness. Try  to experiment to determine which is more comfortable for you.   In general, new machines have 2 years warranty on parts while health insurance allows you to have a new machine once every 5 years.     Common issues with PAP machine:    Mask gets dislodged when turning to the side: Consider getting a CPAP pillow or switching to a mask with hose on top.     Dry mouth:  Your machine has built-in humidifier that heats up the air to prevent dry mouth. It can be adjusted to your comfort. You can try that first and increase setting one level one night at a time to check which setting is comfortable and effective in lessening dry mouth. In some patients with heated hose, adjusting tube temperature to make air warmer can improve dry mouth. If dry mouth persists despite adjusting humidity or tube temperature setting, may apply OTC Biotene gel over the gums at bedtime.  If Biotene gel is not effective, consider trying XEROSTOM gel from [employer].  Also, eliminate or reduce dose of medications that can cause dry mouth if possible. Lastly, may try getting a separate room humidifier machine.    Airleaks: Please call DME as they may need to adjust your mask or refit you with a different kind or different size of mask. In addition, you can ask DME for tips on getting a good mask seal and mask fit.     Difficulty tolerating the mask: Contact your DME to try a different kind of mask and/or call office to get a referral to Sleep Psychologist for CPAP desensitization. CPAP desensitization technique is a set of strategies that helps patient cope with claustrophobia and anxiety related to wearing mask. Alternatively, we can do a daytime mini-sleep study called PAP-nap trial wherein you will try on different kinds of mask and the sleep technician will try different pressure settings on CPAP and BPAP machines to see which specific pressure is tolerable and comfortable for you.     Water droplets or moisture within the hose and/or mask:  "This is called rain-out and it is caused by condensation of too much heated humidity on the cooler walls of the hose. If you have rain-out, turn down humidity settings or get a heated hose. If you already have a heated hose, turn up the \"tube temperature\" of the heated hose. Alternatively, if you don't want to get a heated hose or warmer air, may wrap the CPAP hose with stockings to keep it somewhat warm. Also, you need to place the machine on the floor and lower the hose so that water won't travel upward towards your mask.     PAP desensitization techniques: If you have concerns about something being on your face at night, you can start by getting used to it before trying to sleep with it as follows:      Sit in a comfortable chair or bed. Connect the mask and hose to the CPAP/BPAP machine. Hold the mask on your face (without straps on) and turn on the machine. Practice breathing with the mask on while awake sitting and watching television, reading, or performing a sedentary activity during the day for 5-10 minutes and then take it off.  If tolerated, try again and gradually build up to longer periods of time. If not tolerated, try and try again until it is more comfortable as you become more desensitized. If you are able to use it for at least 20-30 minutes, move unto the next step.     Sit in a comfortable chair or bed. Connect the mask and hose to the CPAP/BPAP machine. Strap the mask on your head and turn on the machine. Practice breathing with the mask and headgear on while awake sitting and watching television, reading, or performing a sedentary activity. Start with 5-10 minutes and gradually increasing time until you can wear it comfortably for at least 20-30 minutes, then move to the next step.    Take a shorter daytime nap with machine turned on while you are in a reclined position in bed, sofa, or recliner. Start with 5-10 minute nap and gradually increase up to 30 minutes. It is not important whether you " fall asleep or not. The goal is to rest comfortably with PAP machine on.     Reintroduce PAP machine into nighttime sleep. You can begin using it a portion of the night and gradually increase up to entire night.     Proceed from one step to the next only when you are completely comfortable. If you feel any anxiety or discomfort, return to the previous step, then proceed again when comfortable.    Expect to “work” with your CPAP/BIPAP unit. It is important to try to relax when beginning CPAP/BIPAP therapy. Inhalation and exhalation should occur through the nose only. If you are unable to consistently breathe this way, do not panic or lose hope. There are other types of masks which allow you to breathe through your nose and/or your mouth. Also, in some patients, using intranasal steroid spray (e.g. Flonase or Nasocort or Fluticasone) 1 hour before bedtime and/or before putting on CPAP mask can help tolerate breathing through the mask.    Don't give up after a few attempts--some patients adjust quickly, while some patients need 3-4 weeks (or sometimes even longer) to be accustomed to CPAP therapy.  Contact your sleep medicine specialist if you have a significant change in weight since this may affect your pressure.    You can also go to the following EDUCATION WEBSITES for further information:   American Academy of Sleep Medicine http://sleepeducation.org  National Sleep Foundation: https://sleepfoundation.org  American Sleep Apnea Association: https://www.sleepapnea.org (for patients with sleep apnea)  Narcolepsy Network: https://www.narcolepsynetwork.org (for patients with narcolepsy)  [employer]: https://www.wakeupnarcolepsy.org (for patients with narcolepsy)  Hypersomnia Foundation: https://www.hypersomniafoundation.org (for patients with idiopathic hypersomnia)  RLS foundation: https://www.rls.org (for patients with restless leg syndrome)    IMPORTANT INFORMATION     Call 911 for medical  emergencies.  Our offices are generally open from Monday-Friday, 8 am - 5 pm.   There are no supporting services by either the sleep doctors or their staff on weekends and Holidays, or after 5 PM on weekdays.   If you need to get in touch with me, you may either call my office number or you can use KeyView.  If a referral for a test, for CPAP, or for another specialist was made, and you have not heard about scheduling this within a week, please call scheduling at [phone]).  If you are unable to make your appointment for clinic or an overnight study, kindly call the office or sleep testing center at least 48 hours in advance to cancel and reschedule.  If you are on CPAP, please bring your device's card and/or the device to each clinic appointment.   In case of problems with PAP machine or mask interface, please contact your [employer] (Durable Medical Equipment) company first. DME is the company who provides you the machine and/or PAP supplies.       PRESCRIPTIONS     We require 7 days advanced notice for prescription refills. If we do not receive the request in this time, we cannot guarantee that your medication will be refilled in time.    IMPORTANT PHONE NUMBERS     Sleep Medicine Clinic Fax: [phone]  Appointments (for Pediatric Sleep Clinic): [phone] - option 1  Appointments (for Adult Sleep Clinic): [phone] - option 2  Appointments (For Sleep Studies): [phone] - option 3  Behavioral Sleep Medicine: [phone]  Sleep Surgery: [phone]  Nutrition Service: [phone]  Weight management clinics with endocrinology: [phone]  Bariatric Services: [phone] (includes weight loss medications and weight loss surgery)  [facility]: [phone] (offers holistic approaches to weight management)  ENT (Otolaryngology): [phone]  Headache Clinic (Neurology): [phone]  Neurology: [phone]  Psychiatry: [phone]  Pulmonary Function Testing  "(PFT) Center: [phone]  Pulmonary Medicine: [phone]  Medical [facility] [employer]): [phone]      OUR SLEEP TESTING LOCATIONS     Our team will contact you to schedule your sleep study, however, you can contact us as follow:  Main Phone Line (scheduling only): [phone]), option 3  Adult and Pediatric Locations  [facility] (6 years and older): [facility] - 4th floor ([address] [facility]) After hours line: [phone]  [facility] (Main campus: All ages): [facility], 6th floor. After hours line: [phone]   [place] (5 years and older; younger considered on case-by-case basis): [address]; [address], [address]. Scheduling  After hours line: [phone]   [place] (6 years and older): [address]; [address]; [address]   [place] (6 years and older): [address] [facility], [address]  After hours line: [phone]   [facility] (13 years and older) in [place]: [address], 2nd floor  After hours line: [phone]  [facility] (13 year and older): [address]  After hours line: [phone]     Adult Only Locations:   [place] (18 years and older): [address] [place] [address], 2nd floor   [place] (18 years and older): [address] [facility]; 4th floor  After hours line: [phone]  [facility] (18 years and older) at [place]: [address] [facility]  After hours line: [phone]     CONTACTING YOUR SLEEP MEDICINE PROVIDER AND SLEEP TEAM      For issues with your machine or mask interface, please call your DME provider first. [employer] for durable medical company. DME is the company who provides you the machine and/or PAP supplies / accessories.   To schedule, cancel, or reschedule SLEEP STUDY APPOINTMENTS, please call the Main Phone Line at [phone] - option 3.   To schedule, cancel, or reschedule CLINIC APPOINTMENTS, you can do it in \"MyChart\", call " "[phone] to speak with my  (Massiel Dai), or call the Main Phone Line at [phone] - option 2  For CLINICAL QUESTIONS or MEDICATION REFILLS, please call direct line for Adult Sleep Nurses at [phone].   Lastly, you can also send a message directly to your provider through \"My Chart\", which is the email service through your  Records Account: [email or web address]       Here at [facility], we wish you a restful sleep!   "

## 2025-02-21 ENCOUNTER — APPOINTMENT (OUTPATIENT)
Dept: PULMONOLOGY | Facility: HOSPITAL | Age: 65
End: 2025-02-21
Payer: MEDICARE

## 2025-03-05 ENCOUNTER — OFFICE VISIT (OUTPATIENT)
Dept: IMMUNOLOGY | Facility: CLINIC | Age: 65
End: 2025-03-05
Payer: MEDICARE

## 2025-03-05 VITALS
HEART RATE: 59 BPM | DIASTOLIC BLOOD PRESSURE: 83 MMHG | RESPIRATION RATE: 21 BRPM | OXYGEN SATURATION: 97 % | WEIGHT: 186.6 LBS | SYSTOLIC BLOOD PRESSURE: 130 MMHG | BODY MASS INDEX: 26.77 KG/M2

## 2025-03-05 DIAGNOSIS — Z11.3 ROUTINE SCREENING FOR STI (SEXUALLY TRANSMITTED INFECTION): ICD-10-CM

## 2025-03-05 DIAGNOSIS — B20 HIV INFECTION, SYMPTOMATIC (MULTI): Primary | ICD-10-CM

## 2025-03-05 LAB
ALBUMIN SERPL BCP-MCNC: 4.9 G/DL (ref 3.4–5)
ALP SERPL-CCNC: 67 U/L (ref 33–136)
ALT SERPL W P-5'-P-CCNC: 43 U/L (ref 10–52)
ANION GAP SERPL CALC-SCNC: 13 MMOL/L (ref 10–20)
AST SERPL W P-5'-P-CCNC: 23 U/L (ref 9–39)
BASOPHILS # BLD AUTO: 0.04 X10*3/UL (ref 0–0.1)
BASOPHILS NFR BLD AUTO: 0.7 %
BILIRUB SERPL-MCNC: 0.8 MG/DL (ref 0–1.2)
BUN SERPL-MCNC: 12 MG/DL (ref 6–23)
CALCIUM SERPL-MCNC: 9.8 MG/DL (ref 8.6–10.6)
CHLORIDE SERPL-SCNC: 105 MMOL/L (ref 98–107)
CO2 SERPL-SCNC: 29 MMOL/L (ref 21–32)
CREAT SERPL-MCNC: 0.96 MG/DL (ref 0.5–1.3)
EGFRCR SERPLBLD CKD-EPI 2021: 87 ML/MIN/1.73M*2
EOSINOPHIL # BLD AUTO: 0.12 X10*3/UL (ref 0–0.7)
EOSINOPHIL NFR BLD AUTO: 2.2 %
ERYTHROCYTE [DISTWIDTH] IN BLOOD BY AUTOMATED COUNT: 12.3 % (ref 11.5–14.5)
GLUCOSE SERPL-MCNC: 99 MG/DL (ref 74–99)
HCT VFR BLD AUTO: 44.3 % (ref 41–52)
HGB BLD-MCNC: 14.7 G/DL (ref 13.5–17.5)
IMM GRANULOCYTES # BLD AUTO: 0.01 X10*3/UL (ref 0–0.7)
IMM GRANULOCYTES NFR BLD AUTO: 0.2 % (ref 0–0.9)
LYMPHOCYTES # BLD AUTO: 1.68 X10*3/UL (ref 1.2–4.8)
LYMPHOCYTES NFR BLD AUTO: 31.1 %
MCH RBC QN AUTO: 30.4 PG (ref 26–34)
MCHC RBC AUTO-ENTMCNC: 33.2 G/DL (ref 32–36)
MCV RBC AUTO: 92 FL (ref 80–100)
MONOCYTES # BLD AUTO: 0.42 X10*3/UL (ref 0.1–1)
MONOCYTES NFR BLD AUTO: 7.8 %
NEUTROPHILS # BLD AUTO: 3.13 X10*3/UL (ref 1.2–7.7)
NEUTROPHILS NFR BLD AUTO: 58 %
NRBC BLD-RTO: 0 /100 WBCS (ref 0–0)
PLATELET # BLD AUTO: 185 X10*3/UL (ref 150–450)
POTASSIUM SERPL-SCNC: 4.4 MMOL/L (ref 3.5–5.3)
PROT SERPL-MCNC: 7.2 G/DL (ref 6.4–8.2)
RBC # BLD AUTO: 4.83 X10*6/UL (ref 4.5–5.9)
SODIUM SERPL-SCNC: 143 MMOL/L (ref 136–145)
TREPONEMA PALLIDUM IGG+IGM AB [PRESENCE] IN SERUM OR PLASMA BY IMMUNOASSAY: REACTIVE
WBC # BLD AUTO: 5.4 X10*3/UL (ref 4.4–11.3)

## 2025-03-05 PROCEDURE — 87491 CHLMYD TRACH DNA AMP PROBE: CPT | Performed by: INTERNAL MEDICINE

## 2025-03-05 PROCEDURE — 87536 HIV-1 QUANT&REVRSE TRNSCRPJ: CPT | Performed by: INTERNAL MEDICINE

## 2025-03-05 PROCEDURE — 86780 TREPONEMA PALLIDUM: CPT | Performed by: INTERNAL MEDICINE

## 2025-03-05 PROCEDURE — 1036F TOBACCO NON-USER: CPT | Performed by: INTERNAL MEDICINE

## 2025-03-05 PROCEDURE — 99214 OFFICE O/P EST MOD 30 MIN: CPT | Mod: GC | Performed by: INTERNAL MEDICINE

## 2025-03-05 PROCEDURE — 99214 OFFICE O/P EST MOD 30 MIN: CPT | Performed by: INTERNAL MEDICINE

## 2025-03-05 PROCEDURE — 88185 FLOWCYTOMETRY/TC ADD-ON: CPT | Performed by: INTERNAL MEDICINE

## 2025-03-05 PROCEDURE — G2211 COMPLEX E/M VISIT ADD ON: HCPCS | Performed by: INTERNAL MEDICINE

## 2025-03-05 PROCEDURE — 1160F RVW MEDS BY RX/DR IN RCRD: CPT | Performed by: INTERNAL MEDICINE

## 2025-03-05 PROCEDURE — 1159F MED LIST DOCD IN RCRD: CPT | Performed by: INTERNAL MEDICINE

## 2025-03-05 PROCEDURE — 84075 ASSAY ALKALINE PHOSPHATASE: CPT | Performed by: INTERNAL MEDICINE

## 2025-03-05 PROCEDURE — 86318 IA INFECTIOUS AGENT ANTIBODY: CPT | Performed by: INTERNAL MEDICINE

## 2025-03-05 PROCEDURE — 85025 COMPLETE CBC W/AUTO DIFF WBC: CPT | Performed by: INTERNAL MEDICINE

## 2025-03-05 PROCEDURE — 87591 N.GONORRHOEAE DNA AMP PROB: CPT | Performed by: INTERNAL MEDICINE

## 2025-03-05 PROCEDURE — 36415 COLL VENOUS BLD VENIPUNCTURE: CPT | Performed by: INTERNAL MEDICINE

## 2025-03-05 PROCEDURE — 1126F AMNT PAIN NOTED NONE PRSNT: CPT | Performed by: INTERNAL MEDICINE

## 2025-03-05 ASSESSMENT — PAIN SCALES - GENERAL: PAINLEVEL_OUTOF10: 0-NO PAIN

## 2025-03-05 NOTE — ASSESSMENT & PLAN NOTE
Patient well-controlled.  Labs reviewed from 7/31/2024, CD4 - 870, viral load undetectable  Currently on Descovy and Tivicay with excellent adherence.  Also on statin  -Continue Descovy and Tivicay  -Check HIV labs  -STI check

## 2025-03-05 NOTE — PROGRESS NOTES
Subjective   Román Martinez is a 65 y.o. male who presents to the [facility] for follow-up of HIV infection.     Patient notes that he is overall feeling well.  He states that he has been compliant on his Descovy and Tivicay without any issues.  The patient notes that he has been sexually active with men.  He notes that he participates in oral, penetrative, and receptive sexual practices.  He denies any dysuria, hematuria, or rash.  The patient is requesting STI testing at this time.    The patient notes that he has been enjoying prison life.  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  The patient notes that he has been going out more, spending time with his friends, and reading.     Objective   Vitals:    03/05/25 1006   BP: 130/83   Pulse: 59   Resp: 21   SpO2: 97%        Current Outpatient Medications:     atorvastatin (Lipitor) 20 mg tablet, Take 1 tablet (20 mg) by mouth once daily., Disp: 30 tablet, Rfl: 5    cholecalciferol (Vitamin D-3) 10 MCG (400 UNIT) tablet, Take 1 tablet (10 mcg) by mouth once daily. LIKE DIRECTED, Disp: , Rfl:     Descovy 200-25 mg tablet, TAKE 1 TABLET BY MOUTH EVERY DAY, Disp: 30 tablet, Rfl: 5    famciclovir (Famvir) 250 mg tablet, TAKE 1 TABLET THREE TIMES A DAY, Disp: 180 tablet, Rfl: 5    multivitamin with minerals (Multiple Vitamin-Minerals) tablet, Take 1 tablet by mouth once daily., Disp: , Rfl:     Tivicay 50 mg tablet, TAKE 1 TABLET BY MOUTH EVERY DAY, Disp: 30 tablet, Rfl: 5    vardenafiL 10 mg tablet,disintegrating, Take 1 tablet by mouth once daily as needed (sex)., Disp: 10 tablet, Rfl: 5    fluticasone (Flonase) 50 mcg/actuation nasal spray, Administer 2 sprays into affected nostril(s) once daily. (Patient not taking: Reported on 3/5/2025), Disp: , Rfl:    Physical Exam  Physical Exam  Constitutional:       General: not in acute distress.     Appearance: Normal appearance.   HENT:      Head: Normocephalic and  "atraumatic.      Pharynx: Oropharynx is clear. No oropharyngeal exudate.   Eyes:      General: No scleral icterus.  Cardiovascular:      Rate and Rhythm: Normal rate and regular rhythm.   Pulmonary:      Breath sounds: Normal breath sounds. No wheezing or rhonchi.   Abdominal:      Palpations: Abdomen is soft.      Tenderness: There is no abdominal tenderness.   Musculoskeletal:      Cervical back: Neck supple.      Right lower leg: No edema.      Left lower leg: No edema.   Skin:     Findings: No rash.   Neurological:      Mental Status: alert.   Psychiatric:         Mood and Affect: Mood normal.     Laboratory  Lab Results   Component Value Date    BOQ1BUOJA <Quantification (A) 04/25/2023    LO1CCB2MNPA 0.870 07/31/2024      Lab Results   Component Value Date    WBC 5.6 07/31/2024    HGB 14.3 07/31/2024    HCT 42.8 07/31/2024    MCV 93 07/31/2024     07/31/2024      Lab Results   Component Value Date    GLUCOSE 89 07/31/2024    CALCIUM 9.6 07/31/2024     07/31/2024    K 4.2 07/31/2024    CO2 27 07/31/2024     07/31/2024    BUN 13 07/31/2024    CREATININE 0.90 07/31/2024      Lab Results   Component Value Date    CHOL 110 07/31/2024    CHOL 108 02/14/2024    CHOL 181 10/10/2023     Lab Results   Component Value Date    HDL 46.3 07/31/2024    HDL 47.1 02/14/2024    HDL 49.4 10/10/2023     Lab Results   Component Value Date    LDLCALC 51 07/31/2024    LDLCALC 51 02/14/2024    LDLCALC 115 (L) 10/10/2023     Lab Results   Component Value Date    TRIG 66 07/31/2024    TRIG 49 02/14/2024    TRIG 81 10/10/2023     No components found for: \"CHOLHDL\"      Assessment/Plan   Problem List Items Addressed This Visit          Infectious Diseases    HIV infection, symptomatic (Multi) - Primary    Overview     Biktarvy gave him diarrhea         Current Assessment & Plan     Patient well-controlled.  Labs reviewed from 7/31/2024, CD4 - 870, viral load undetectable  Currently on Descovy and Tivicay with excellent " adherence.  Also on statin  -Continue Descovy and Tivicay  -Check HIV labs  -STI check         Relevant Orders    CD4/8 Panel    CBC and Auto Differential    Comprehensive Metabolic Panel    HIV RNA, quantitative, PCR     Other Visit Diagnoses       Routine screening for STI (sexually transmitted infection)        Relevant Orders    C. trachomatis / N. gonorrhoeae, Amplified, Urogenital    Syphilis Screen with Reflex    Neisseria gonorrhoeae, Amplified, Rectal    C. trachomatis / N. gonorrhoeae, Amplified, Throat           Health Maintenance  Sexual Health: Currently sexually active with men.  He is requesting STI testing.  Triple site testing ordered  Vaccinations: Currently up-to-date  Cancer Screenings: Colonoscopy reviewed from 2021, due again 2028  Never smoker    I discussed findings, evaluation, and plan with ID attending , Dr. Umm Foley.     Gilberto Shi DO  PGY-IV, Infectious Disease Fellow    Gilberto Shi DO   I saw and evaluated the patient. I personally obtained the key and critical portions of the history and physical exam or was physically present for key and critical portions performed by the resident/fellow. I reviewed the resident/fellow's documentation and discussed the patient with the resident/fellow. I agree with the resident/fellow's medical decision making as documented in the note.  Witj edits included above  Umm Foley MD

## 2025-03-06 LAB
C TRACH RRNA SPEC QL NAA+PROBE: NEGATIVE
C TRACH RRNA SPEC QL NAA+PROBE: NEGATIVE
CD3+CD4+ CELLS # BLD: 0.81 X10E9/L
CD3+CD4+ CELLS # BLD: 806 /MM3
CD3+CD4+ CELLS NFR BLD: 48 %
CD3+CD4+ CELLS/CD3+CD8+ CLL BLD: 1.71 %
CD3+CD8+ CELLS # BLD: 0.47 X10E9/L
CD3+CD8+ CELLS NFR BLD: 28 %
FLOW CYTOMETRY SPECIALIST REVIEW: NORMAL
HIV1 RNA # PLAS NAA DL=20: 21 COPIES/ML
HIV1 RNA # PLAS NAA DL=20: DETECTED {COPIES}/ML
HIV1 RNA SPEC NAA+PROBE-LOG#: 1.32 LOG10 CPY/ML
LYMPHOCYTES # SPEC AUTO: 1.68 X10*3/UL
N GONORRHOEA DNA SPEC QL PROBE+SIG AMP: NEGATIVE
RPR SER QL: NONREACTIVE

## 2025-03-07 LAB — T PALLIDUM AB SER QL AGGL: REACTIVE

## 2025-03-12 ENCOUNTER — APPOINTMENT (OUTPATIENT)
Dept: DERMATOLOGY | Facility: CLINIC | Age: 65
End: 2025-03-12
Payer: MEDICARE

## 2025-03-12 DIAGNOSIS — L91.8 SKIN TAG: Primary | ICD-10-CM

## 2025-03-12 DIAGNOSIS — R52 PAIN: ICD-10-CM

## 2025-03-12 PROCEDURE — 99203 OFFICE O/P NEW LOW 30 MIN: CPT | Performed by: NURSE PRACTITIONER

## 2025-03-12 PROCEDURE — 1036F TOBACCO NON-USER: CPT | Performed by: NURSE PRACTITIONER

## 2025-03-12 PROCEDURE — 1160F RVW MEDS BY RX/DR IN RCRD: CPT | Performed by: NURSE PRACTITIONER

## 2025-03-12 PROCEDURE — 1159F MED LIST DOCD IN RCRD: CPT | Performed by: NURSE PRACTITIONER

## 2025-03-12 ASSESSMENT — DERMATOLOGY QUALITY OF LIFE (QOL) ASSESSMENT
RATE HOW EMOTIONALLY BOTHERED YOU ARE BY YOUR SKIN PROBLEM (FOR EXAMPLE, WORRY, EMBARRASSMENT, FRUSTRATION): 4
RATE HOW BOTHERED YOU ARE BY EFFECTS OF YOUR SKIN PROBLEMS ON YOUR ACTIVITIES (EG, GOING OUT, ACCOMPLISHING WHAT YOU WANT, WORK ACTIVITIES OR YOUR RELATIONSHIPS WITH OTHERS): 1
RATE HOW EMOTIONALLY BOTHERED YOU ARE BY YOUR SKIN PROBLEM (FOR EXAMPLE, WORRY, EMBARRASSMENT, FRUSTRATION): 4
RATE HOW BOTHERED YOU ARE BY SYMPTOMS OF YOUR SKIN PROBLEM (EG, ITCHING, STINGING BURNING, HURTING OR SKIN IRRITATION): 3
WHAT SINGLE SKIN CONDITION LISTED BELOW IS THE PATIENT ANSWERING THE QUALITY-OF-LIFE ASSESSMENT QUESTIONS ABOUT: NONE OF THE ABOVE
RATE HOW BOTHERED YOU ARE BY SYMPTOMS OF YOUR SKIN PROBLEM (EG, ITCHING, STINGING BURNING, HURTING OR SKIN IRRITATION): 3
RATE HOW BOTHERED YOU ARE BY EFFECTS OF YOUR SKIN PROBLEMS ON YOUR ACTIVITIES (EG, GOING OUT, ACCOMPLISHING WHAT YOU WANT, WORK ACTIVITIES OR YOUR RELATIONSHIPS WITH OTHERS): 1
WHAT SINGLE SKIN CONDITION LISTED BELOW IS THE PATIENT ANSWERING THE QUALITY-OF-LIFE ASSESSMENT QUESTIONS ABOUT: NONE OF THE ABOVE

## 2025-03-12 NOTE — PROGRESS NOTES
Subjective     Román Martinez Jr. is a 65 y.o. male who presents for the following: Suspicious Skin Lesion (Pt presents for lesions of concern around the neck, chest and axilla./).     Review of Systems:  No other skin or systemic complaints other than what is documented elsewhere in the note.    The following portions of the chart were reviewed this encounter and updated as appropriate:  Tobacco  Allergies  Meds  Problems  Med Hx  Surg Hx  Fam Hx       Skin Cancer History  No skin cancer on file.    Specialty Problems          Dermatology Problems    Skin tag     Past Medical History:  Román Martinez Jr.  has a past medical history of Anogenital (venereal) warts, Atypical squamous cells of undetermined significance on cytologic smear of cervix (ASC-US), Chlamydial infection, unspecified, Contact with and (suspected) exposure to infections with a predominantly sexual mode of transmission (11/17/2016), Fracture of unspecified carpal bone, right wrist, initial encounter for closed fracture, Other chest pain, Other conditions influencing health status, Other long term (current) drug therapy (05/12/2015), Personal history of diseases of the skin and subcutaneous tissue, Personal history of other diseases of male genital organs, Personal history of other diseases of male genital organs, Personal history of other diseases of the digestive system, Personal history of other diseases of the respiratory system, Personal history of other diseases of the respiratory system, Personal history of other infectious and parasitic diseases, Personal history of other infectious and parasitic diseases, Personal history of other infectious and parasitic diseases, Testicular hypofunction, and Unspecified disorder of synovium and tendon, unspecified site.    Past Surgical History:  Román Martinez Jr.  has a past surgical history that includes Other surgical history (01/23/2014); Esophagogastroduodenoscopy (03/02/2012); and Hernia repair  (Left, 03/09/2015).    Family History:  Patient family history includes Heart failure in his father and mother; Kidney disease in his mother; Multiple sclerosis in his sister; Prostate cancer in his father; Sleep apnea in his father.    Social History:  Román Martinez Jr.  reports that he has never smoked. He has never used smokeless tobacco. He reports current alcohol use of about 6.0 - 8.0 standard drinks of alcohol per week. He reports current drug use. Drug: Marijuana.    Allergies:  Acyclovir    Current Medications / CAM's:    Current Outpatient Medications:     atorvastatin (Lipitor) 20 mg tablet, Take 1 tablet (20 mg) by mouth once daily., Disp: 30 tablet, Rfl: 5    cholecalciferol (Vitamin D-3) 10 MCG (400 UNIT) tablet, Take 1 tablet (10 mcg) by mouth once daily. LIKE DIRECTED, Disp: , Rfl:     Descovy 200-25 mg tablet, TAKE 1 TABLET BY MOUTH EVERY DAY, Disp: 30 tablet, Rfl: 5    famciclovir (Famvir) 250 mg tablet, TAKE 1 TABLET THREE TIMES A DAY, Disp: 180 tablet, Rfl: 5    fluticasone (Flonase) 50 mcg/actuation nasal spray, Administer 2 sprays into affected nostril(s) once daily. (Patient not taking: Reported on 3/5/2025), Disp: , Rfl:     multivitamin with minerals (Multiple Vitamin-Minerals) tablet, Take 1 tablet by mouth once daily., Disp: , Rfl:     Tivicay 50 mg tablet, TAKE 1 TABLET BY MOUTH EVERY DAY, Disp: 30 tablet, Rfl: 5    vardenafiL 10 mg tablet,disintegrating, Take 1 tablet by mouth once daily as needed (sex)., Disp: 10 tablet, Rfl: 5     Objective   Well appearing patient in no apparent distress; mood and affect are within normal limits.    A focused skin examination was performed. All findings within normal limits unless otherwise noted below.    Assessment/Plan   1. Skin tag (3)  Left Axilla, Neck - Anterior, Right Axilla  Fleshy, skin-colored sessile and pedunculated papules.     Acrochordon (skin tag)  - The benign nature of the lesion was discussed with patient.   - A skin tag  (acrochordon) is a common, possibly inherited condition that manifests as small, flesh-colored growths on a thin stalk. Skin tags are benign lesions that can sometimes become irritated or traumatized.  - Skin tags are very common, and their incidence increases with age. Seen more often in people with growth hormone excess (acromegaly).   - Skin tags are most commonly found on the eyelids, neck, armpits, and groin area. They are flesh-colored growths on a thin stalk, ranging in size from small to large.      Please call me if there are any changes or development of concerning symptoms (lesion/skin condition is changing, bleeding, enlarging, or worsening).

## 2025-03-15 DIAGNOSIS — B20 HIV INFECTION, SYMPTOMATIC (MULTI): ICD-10-CM

## 2025-03-17 RX ORDER — ATORVASTATIN CALCIUM 20 MG/1
20 TABLET, FILM COATED ORAL DAILY
Qty: 30 TABLET | Refills: 5 | Status: SHIPPED | OUTPATIENT
Start: 2025-03-17

## 2025-04-16 ENCOUNTER — APPOINTMENT (OUTPATIENT)
Dept: DERMATOLOGY | Facility: CLINIC | Age: 65
End: 2025-04-16
Payer: MEDICARE

## 2025-04-16 DIAGNOSIS — L91.8 SKIN TAG: ICD-10-CM

## 2025-04-16 DIAGNOSIS — R52 PAIN: ICD-10-CM

## 2025-04-16 PROCEDURE — 1036F TOBACCO NON-USER: CPT | Performed by: NURSE PRACTITIONER

## 2025-04-16 PROCEDURE — 11200 RMVL SKIN TAGS UP TO&INC 15: CPT | Performed by: NURSE PRACTITIONER

## 2025-04-16 PROCEDURE — 1160F RVW MEDS BY RX/DR IN RCRD: CPT | Performed by: NURSE PRACTITIONER

## 2025-04-16 PROCEDURE — 1159F MED LIST DOCD IN RCRD: CPT | Performed by: NURSE PRACTITIONER

## 2025-04-16 ASSESSMENT — DERMATOLOGY QUALITY OF LIFE (QOL) ASSESSMENT
RATE HOW BOTHERED YOU ARE BY EFFECTS OF YOUR SKIN PROBLEMS ON YOUR ACTIVITIES (EG, GOING OUT, ACCOMPLISHING WHAT YOU WANT, WORK ACTIVITIES OR YOUR RELATIONSHIPS WITH OTHERS): 3
DATE THE QUALITY-OF-LIFE ASSESSMENT WAS COMPLETED: 67311
RATE HOW EMOTIONALLY BOTHERED YOU ARE BY YOUR SKIN PROBLEM (FOR EXAMPLE, WORRY, EMBARRASSMENT, FRUSTRATION): 6 - ALWAYS BOTHERED
RATE HOW BOTHERED YOU ARE BY SYMPTOMS OF YOUR SKIN PROBLEM (EG, ITCHING, STINGING BURNING, HURTING OR SKIN IRRITATION): 4
RATE HOW BOTHERED YOU ARE BY SYMPTOMS OF YOUR SKIN PROBLEM (EG, ITCHING, STINGING BURNING, HURTING OR SKIN IRRITATION): 4
RATE HOW BOTHERED YOU ARE BY EFFECTS OF YOUR SKIN PROBLEMS ON YOUR ACTIVITIES (EG, GOING OUT, ACCOMPLISHING WHAT YOU WANT, WORK ACTIVITIES OR YOUR RELATIONSHIPS WITH OTHERS): 3
RATE HOW EMOTIONALLY BOTHERED YOU ARE BY YOUR SKIN PROBLEM (FOR EXAMPLE, WORRY, EMBARRASSMENT, FRUSTRATION): 6 - ALWAYS BOTHERED

## 2025-04-16 ASSESSMENT — PATIENT GLOBAL ASSESSMENT (PGA): WHAT IS THE PGA: PATIENT GLOBAL ASSESSMENT:  4 - SEVERE

## 2025-04-16 NOTE — Clinical Note
fter clinical evaluation including history and examination today, a decision was made to perform a procedure for removal of inflamed skin tag(s).    Procedure Note:  Skin Tag Removal, CPT 71294    Anesthesia: The site was then anesthetized with 1% Lidocaine with epinephrine 1:100,000.   Lesions prepped with alcohol  Biopsy: not indicated  Cosmetic: no, lesions are clinically irritated/inflamed on physical examination today    Time Out Procedural Pause  Correct Patient: Yes  Correct Procedure: Yes  Correct Site: Yes  Available Equipment: Yes  I authorize that the above is correct. Initials- SLM    Lesion(s) removed using forceps and an iris scissor. Aluminum chloride was applied to stop minimal bleeding. Vaseline was applied to any open wounds.    A total of 12 lesion(s) were removed

## 2025-04-16 NOTE — PROGRESS NOTES
Subjective     Román Martinez Jr. is a 65 y.o. male who presents for the following: Skin Tag (Pt presents to office for evaluation and removal of skin tags around the neck and in the axilla.).     Review of Systems:  No other skin or systemic complaints other than what is documented elsewhere in the note.    The following portions of the chart were reviewed this encounter and updated as appropriate:  Tobacco  Allergies  Meds  Problems  Med Hx  Surg Hx  Fam Hx         Skin Cancer History  Biopsy Log Book  No skin cancers from Specimen Tracking.    Additional History      Specialty Problems          Dermatology Problems    Skin tag        Objective   Well appearing patient in no apparent distress; mood and affect are within normal limits.    A full examination was performed including scalp, head, eyes, ears, nose, lips, neck, chest, axillae, abdomen, back, buttocks, bilateral upper extremities, bilateral lower extremities, hands, feet, fingers, toes, fingernails, and toenails. All findings within normal limits unless otherwise noted below.    Assessment/Plan   Skin Exam  1. SKIN TAG  Neck - Anterior  Smooth, flesh-colored/brown, pedunculated growths   fter clinical evaluation including history and examination today, a decision was made to perform a procedure for removal of inflamed skin tag(s).    Procedure Note:  Skin Tag Removal, CPT 56220    Anesthesia: The site was then anesthetized with 1% Lidocaine with epinephrine 1:100,000.   Lesions prepped with alcohol  Biopsy: not indicated  Cosmetic: no, lesions are clinically irritated/inflamed on physical examination today    Time Out Procedural Pause  Correct Patient: Yes  Correct Procedure: Yes  Correct Site: Yes  Available Equipment: Yes  I authorize that the above is correct. Initials- SLM    Lesion(s) removed using forceps and an iris scissor. Aluminum chloride was applied to stop minimal bleeding. Vaseline was applied to any open wounds.    A total of 12  lesion(s) were removed       Related Procedures  Prior Authorization for Skin Excision - Skin Tag Removal  2. PAIN      Related Procedures  Prior Authorization for Skin Excision - Skin Tag Removal

## 2025-05-23 DIAGNOSIS — B20 HIV INFECTION, SYMPTOMATIC (MULTI): ICD-10-CM

## 2025-05-23 RX ORDER — DOLUTEGRAVIR SODIUM 50 MG/1
50 TABLET, FILM COATED ORAL DAILY
Qty: 30 TABLET | Refills: 5 | Status: SHIPPED | OUTPATIENT
Start: 2025-05-23

## 2025-05-23 RX ORDER — EMTRICITABINE AND TENOFOVIR ALAFENAMIDE 200; 25 MG/1; MG/1
1 TABLET ORAL DAILY
Qty: 30 TABLET | Refills: 5 | Status: SHIPPED | OUTPATIENT
Start: 2025-05-23

## 2025-05-29 ENCOUNTER — APPOINTMENT (OUTPATIENT)
Dept: DERMATOLOGY | Facility: CLINIC | Age: 65
End: 2025-05-29
Payer: MEDICARE

## 2025-05-29 DIAGNOSIS — L21.9 SEBORRHEIC DERMATITIS: ICD-10-CM

## 2025-05-29 DIAGNOSIS — L91.8 SKIN TAG: ICD-10-CM

## 2025-05-29 DIAGNOSIS — L85.9 HYPERKERATOSIS: Primary | ICD-10-CM

## 2025-05-29 PROCEDURE — 11200 RMVL SKIN TAGS UP TO&INC 15: CPT | Performed by: NURSE PRACTITIONER

## 2025-05-29 PROCEDURE — 1160F RVW MEDS BY RX/DR IN RCRD: CPT | Performed by: NURSE PRACTITIONER

## 2025-05-29 PROCEDURE — 1036F TOBACCO NON-USER: CPT | Performed by: NURSE PRACTITIONER

## 2025-05-29 PROCEDURE — 1159F MED LIST DOCD IN RCRD: CPT | Performed by: NURSE PRACTITIONER

## 2025-05-29 RX ORDER — SELENIUM SULFIDE 2.5 MG/100ML
LOTION TOPICAL
Qty: 118 ML | Refills: 6 | Status: SHIPPED | OUTPATIENT
Start: 2025-05-29

## 2025-05-29 NOTE — PROGRESS NOTES
Subjective     Román Martinez Jr. is a 65 y.o. male who presents for the following: Skin Tag (Bilateral axilla ).          Review of Systems:  No other skin or systemic complaints other than what is documented elsewhere in the note.    The following portions of the chart were reviewed this encounter and updated as appropriate:  Tobacco  Allergies  Meds  Problems  Med Hx  Surg Hx  Fam Hx         Skin Cancer History  Biopsy Log Book  No skin cancers from Specimen Tracking.    Additional History      Specialty Problems          Dermatology Problems    Skin tag     Past Medical History:  Román Martinez Jr.  has a past medical history of Anogenital (venereal) warts, Atypical squamous cells of undetermined significance on cytologic smear of cervix (ASC-US), Chlamydial infection, unspecified, Contact with and (suspected) exposure to infections with a predominantly sexual mode of transmission (11/17/2016), Fracture of unspecified carpal bone, right wrist, initial encounter for closed fracture, Other chest pain, Other conditions influencing health status, Other long term (current) drug therapy (05/12/2015), Personal history of diseases of the skin and subcutaneous tissue, Personal history of other diseases of male genital organs, Personal history of other diseases of male genital organs, Personal history of other diseases of the digestive system, Personal history of other diseases of the respiratory system, Personal history of other diseases of the respiratory system, Personal history of other infectious and parasitic diseases, Personal history of other infectious and parasitic diseases, Personal history of other infectious and parasitic diseases, Testicular hypofunction, and Unspecified disorder of synovium and tendon, unspecified site.    Past Surgical History:  Román Martinez Jr.  has a past surgical history that includes Other surgical history (01/23/2014); Esophagogastroduodenoscopy (03/02/2012); and Hernia repair  Subjective   Amy Silva is a 27 y.o. male  Hypertension (2 month follow up on BP, sometimes forgets to take medication)      History of Present Illness   The patient presents today for follow-up on hypertension.    The patient admits he does not administer his blood pressure medication every day; however, he reports that he did administer it today, 01/18/2023, and yesterday, 01/17/2023. He states his blood pressure is still higher than it was previously. He reports that recently, he has started experiencing a headache when he is drinking alcohol, but he denies that he drinks alcohol frequently. He denies waking up with headaches. He denies a family history of hypertension. He states he usually takes his blood pressure medication in the morning.    The patient reports that he has not heard from the sleep study clinic as of yet.    The patient states he was previously on Zoloft when he was approximately 15 or 16 years old. He states it helped him a lot when he was younger, and over the past few years he believes he needs to be back on it because his mind races often, and he deals with depression. He believes that it will help him calm down.    The patient states he has herpes, but he does not have it often, and he would like a prescription for this.    The following portions of the patient's history were reviewed and updated as appropriate: allergies, current medications, past social history and problem list    Review of Systems   Constitutional: Negative for appetite change, fatigue and unexpected weight change.   Respiratory: Negative for cough, chest tightness and shortness of breath.    Cardiovascular: Negative for chest pain, palpitations and leg swelling.   Gastrointestinal: Negative for abdominal pain, diarrhea and nausea.   Genitourinary: Positive for genital sores.   Skin: Negative for color change and rash.   Neurological: Positive for headaches. Negative for dizziness, tremors, syncope, weakness and  (Left, 03/09/2015).    Family History:  Patient family history includes Heart failure in his father and mother; Kidney disease in his mother; Multiple sclerosis in his sister; Prostate cancer in his father; Sleep apnea in his father.    Social History:  Román Martinez Jr.  reports that he has never smoked. He has never used smokeless tobacco. He reports current alcohol use of about 6.0 - 8.0 standard drinks of alcohol per week. He reports current drug use. Drug: Marijuana.    Allergies:  Acyclovir    Current Medications / CAM's:  Current Medications[1]     Objective   Well appearing patient in no apparent distress; mood and affect are within normal limits.    A focused skin examination was performed. All findings within normal limits unless otherwise noted below.    Assessment/Plan   Skin Exam  1. HYPERKERATOSIS  Generalized  Dry skin of the feet with cracking or fissures at the heel.   Cracked heels are a very common foot problem caused by dry or calloused skin. They may be present year-round or only in the cold months when indoor heating dries the skin. For most people, heel fissures are merely annoying. But the fissures can become deep enough to bleed and cause pain. Also, openings into the skin are possible sites for bacteria to enter and cause infection.  Plan  - Caring for your feet will help fissures to heal:  Use a pumice stone to smooth out callouses  Moisturize skin as often as possible, and especially after bathing  Soak feet in warm water briefly and then moisturize  Use urea cream twice daily to reduce callouses  Use salicylic acid or lactic acid based moisturizers, or petroleum jelly, to fight dry skin  Wear supportive, well-fitting shoes  Bandage open wounds  - Start selenium sulfide, use as directed.   - Risks, benefits, and side effects discussed. Patient understood and agrees with the plan.     Related Medications  selenium sulfide (Selsun) 2.5 % shampoo  Apply to affected area(s) and lather with  light-headedness.   Psychiatric/Behavioral: Positive for dysphoric mood. Negative for agitation, behavioral problems, confusion, decreased concentration, hallucinations, self-injury, sleep disturbance and suicidal ideas. The patient is nervous/anxious. The patient is not hyperactive.        Objective     Vitals:    01/18/23 1536   BP: 166/92   Pulse: 86   Temp: 97.2 °F (36.2 °C)   SpO2: 99%       Physical Exam  Vitals and nursing note reviewed.   Constitutional:       General: He is not in acute distress.     Appearance: Normal appearance. He is well-developed. He is obese. He is not ill-appearing, toxic-appearing or diaphoretic.      Comments: Obesity noted  BMI45   Neck:      Thyroid: No thyromegaly.      Vascular: No carotid bruit or JVD.   Cardiovascular:      Rate and Rhythm: Normal rate and regular rhythm.      Pulses: Normal pulses.      Heart sounds: Normal heart sounds. No murmur heard.  Pulmonary:      Effort: Pulmonary effort is normal. No respiratory distress.      Breath sounds: Normal breath sounds.   Abdominal:      Palpations: Abdomen is soft. There is no mass.      Tenderness: There is no abdominal tenderness.   Skin:     General: Skin is warm and dry.   Neurological:      Mental Status: He is alert.   Psychiatric:         Mood and Affect: Mood normal.         Behavior: Behavior normal.         Thought Content: Thought content normal.         Judgment: Judgment normal.         Assessment & Plan     Diagnoses and all orders for this visit:    1. Primary hypertension (Primary)    2. Herpes genitalis in men    3. Anxiety and depression    Other orders  -     NIFEdipine XL (Procardia XL) 60 MG 24 hr tablet; Take 1 tablet by mouth Daily.  Dispense: 30 tablet; Refill: 1  -     sertraline (Zoloft) 50 MG tablet; Take 1 tablet by mouth Daily. For depression and anxiety  Dispense: 30 tablet; Refill: 1  -     valACYclovir (Valtrex) 500 MG tablet; Take 1 tablet by mouth 2 (Two) Times a Day. As needed for  outbreaks  Dispense: 10 tablet; Refill: 11    1. Hypertension  Discontinue present blood pressure medication.  Start taking new blood pressure medication at bedtime.    2. Depression  Start taking Zoloft 50 mg daily.  If he cannot tell any difference after 10 days on it, he will call me and I can adjust the dose up.    3. Herpes  Start taking Valtrex 1 tablet twice daily as needed for outbreaks.     Transcribed from ambient dictation for Kinga Dye PA-C by Brooklyn Castro.  01/18/23   19:04 EST    Patient or patient representative verbalized consent to the visit recording.  I have personally performed the services described in this document as transcribed by the above individual, and it is both accurate and complete.  Kinga Dye PA-C  1/19/2023  13:20 EST       small amounts of water, leave on for 5-10 minutes then rinse. Once clear, use weekly for maintenance.  2. SEBORRHEIC DERMATITIS      Related Medications  selenium sulfide (Selsun) 2.5 % shampoo  Apply to affected area(s) and lather with small amounts of water, leave on for 5-10 minutes then rinse. Once clear, use weekly for maintenance.  3. SKIN TAG  Neck - Anterior  Smooth, flesh-colored/brown, pedunculated growths   fter clinical evaluation including history and examination today, a decision was made to perform a procedure for removal of inflamed skin tag(s).    Procedure Note:  Skin Tag Removal, CPT 48836    Anesthesia: The site was then anesthetized with 1% Lidocaine with epinephrine 1:100,000.   Lesions prepped with alcohol  Biopsy: not indicated  Cosmetic: no, lesions are clinically irritated/inflamed on physical examination today    Time Out Procedural Pause  Correct Patient: Yes  Correct Procedure: Yes  Correct Site: Yes  Available Equipment: Yes  I authorize that the above is correct. Initials- SLM    Lesion(s) removed using forceps and an iris scissor. Aluminum chloride was applied to stop minimal bleeding. Vaseline was applied to any open wounds.    A total of 4 lesion(s) were removed              [1]   Current Outpatient Medications:     atorvastatin (Lipitor) 20 mg tablet, TAKE 1 TABLET BY MOUTH EVERY DAY, Disp: 30 tablet, Rfl: 5    cholecalciferol (Vitamin D-3) 10 MCG (400 UNIT) tablet, Take 1 tablet (10 mcg) by mouth once daily. LIKE DIRECTED, Disp: , Rfl:     Descovy 200-25 mg tablet, TAKE 1 TABLET BY MOUTH EVERY DAY, Disp: 30 tablet, Rfl: 5    famciclovir (Famvir) 250 mg tablet, TAKE 1 TABLET THREE TIMES A DAY, Disp: 180 tablet, Rfl: 5    multivitamin with minerals (Multiple Vitamin-Minerals) tablet, Take 1 tablet by mouth once daily., Disp: , Rfl:     selenium sulfide (Selsun) 2.5 % shampoo, Apply to affected area(s) and lather with small amounts of water, leave on for 5-10 minutes then rinse. Once  clear, use weekly for maintenance., Disp: 118 mL, Rfl: 6    Tivicay 50 mg tablet, TAKE 1 TABLET BY MOUTH EVERY DAY, Disp: 30 tablet, Rfl: 5    vardenafiL 10 mg tablet,disintegrating, Take 1 tablet by mouth once daily as needed (sex)., Disp: 10 tablet, Rfl: 5

## 2025-05-29 NOTE — Clinical Note
Cracked heels are a very common foot problem caused by dry or calloused skin. They may be present year-round or only in the cold months when indoor heating dries the skin. For most people, heel fissures are merely annoying. But the fissures can become deep enough to bleed and cause pain. Also, openings into the skin are possible sites for bacteria to enter and cause infection.  Plan  - Caring for your feet will help fissures to heal:  Use a pumice stone to smooth out callouses  Moisturize skin as often as possible, and especially after bathing  Soak feet in warm water briefly and then moisturize  Use urea cream twice daily to reduce callouses  Use salicylic acid or lactic acid based moisturizers, or petroleum jelly, to fight dry skin  Wear supportive, well-fitting shoes  Bandage open wounds  - Start selenium sulfide, use as directed.   - Risks, benefits, and side effects discussed. Patient understood and agrees with the plan.

## 2025-05-29 NOTE — Clinical Note
fter clinical evaluation including history and examination today, a decision was made to perform a procedure for removal of inflamed skin tag(s).    Procedure Note:  Skin Tag Removal, CPT 17607    Anesthesia: The site was then anesthetized with 1% Lidocaine with epinephrine 1:100,000.   Lesions prepped with alcohol  Biopsy: not indicated  Cosmetic: no, lesions are clinically irritated/inflamed on physical examination today    Time Out Procedural Pause  Correct Patient: Yes  Correct Procedure: Yes  Correct Site: Yes  Available Equipment: Yes  I authorize that the above is correct. Initials- SLM    Lesion(s) removed using forceps and an iris scissor. Aluminum chloride was applied to stop minimal bleeding. Vaseline was applied to any open wounds.    A total of 4 lesion(s) were removed

## 2025-07-06 ENCOUNTER — PATIENT MESSAGE (OUTPATIENT)
Dept: SLEEP MEDICINE | Facility: HOSPITAL | Age: 65
End: 2025-07-06
Payer: MEDICARE

## 2025-07-06 DIAGNOSIS — G47.33 OSA (OBSTRUCTIVE SLEEP APNEA): Primary | ICD-10-CM

## 2025-07-09 NOTE — PATIENT COMMUNICATION
Mr. Martinez,     Good Morning! Thanks for requiring the new pap. Yes, you are eligible to get a new pap since you received the old pap over 5 years ago. However, Medicare requires the patient to have a sleep study within a year. If you would like to have a sleep study to confirm your diagnosis. I am happy to place a new order for you. Please let me know your decision. Thanks.

## 2025-07-14 ENCOUNTER — APPOINTMENT (OUTPATIENT)
Dept: SLEEP MEDICINE | Facility: HOSPITAL | Age: 65
End: 2025-07-14
Payer: MEDICARE

## 2025-07-16 ENCOUNTER — CLINICAL SUPPORT (OUTPATIENT)
Dept: SLEEP MEDICINE | Facility: HOSPITAL | Age: 65
End: 2025-07-16
Payer: MEDICARE

## 2025-07-16 DIAGNOSIS — G47.33 OSA (OBSTRUCTIVE SLEEP APNEA): ICD-10-CM

## 2025-07-16 PROCEDURE — 95806 SLEEP STUDY UNATT&RESP EFFT: CPT | Performed by: PSYCHIATRY & NEUROLOGY

## 2025-07-21 ENCOUNTER — RESULTS FOLLOW-UP (OUTPATIENT)
Dept: PULMONOLOGY | Facility: HOSPITAL | Age: 65
End: 2025-07-21
Payer: MEDICARE

## 2025-09-09 ENCOUNTER — APPOINTMENT (OUTPATIENT)
Dept: IMMUNOLOGY | Facility: CLINIC | Age: 65
End: 2025-09-09
Payer: MEDICARE